# Patient Record
Sex: MALE | Race: WHITE | NOT HISPANIC OR LATINO
[De-identification: names, ages, dates, MRNs, and addresses within clinical notes are randomized per-mention and may not be internally consistent; named-entity substitution may affect disease eponyms.]

---

## 2019-01-16 ENCOUNTER — RESULT REVIEW (OUTPATIENT)
Age: 49
End: 2019-01-16

## 2019-06-18 ENCOUNTER — RESULT REVIEW (OUTPATIENT)
Age: 49
End: 2019-06-18

## 2020-12-12 ENCOUNTER — OUTPATIENT (OUTPATIENT)
Dept: OUTPATIENT SERVICES | Facility: HOSPITAL | Age: 50
LOS: 1 days | End: 2020-12-12
Payer: COMMERCIAL

## 2020-12-12 DIAGNOSIS — Z11.59 ENCOUNTER FOR SCREENING FOR OTHER VIRAL DISEASES: ICD-10-CM

## 2020-12-12 LAB — SARS-COV-2 RNA SPEC QL NAA+PROBE: SIGNIFICANT CHANGE UP

## 2020-12-12 PROCEDURE — U0003: CPT

## 2020-12-13 DIAGNOSIS — Z11.59 ENCOUNTER FOR SCREENING FOR OTHER VIRAL DISEASES: ICD-10-CM

## 2021-01-02 ENCOUNTER — OUTPATIENT (OUTPATIENT)
Dept: OUTPATIENT SERVICES | Facility: HOSPITAL | Age: 51
LOS: 1 days | End: 2021-01-02
Payer: COMMERCIAL

## 2021-01-02 DIAGNOSIS — Z20.828 CONTACT WITH AND (SUSPECTED) EXPOSURE TO OTHER VIRAL COMMUNICABLE DISEASES: ICD-10-CM

## 2021-01-02 PROCEDURE — C9803: CPT

## 2021-01-02 PROCEDURE — U0005: CPT

## 2021-01-02 PROCEDURE — U0003: CPT

## 2021-01-03 DIAGNOSIS — Z20.828 CONTACT WITH AND (SUSPECTED) EXPOSURE TO OTHER VIRAL COMMUNICABLE DISEASES: ICD-10-CM

## 2021-01-03 LAB — SARS-COV-2 RNA SPEC QL NAA+PROBE: SIGNIFICANT CHANGE UP

## 2021-11-17 ENCOUNTER — APPOINTMENT (OUTPATIENT)
Dept: FAMILY MEDICINE | Facility: CLINIC | Age: 51
End: 2021-11-17

## 2021-11-24 ENCOUNTER — APPOINTMENT (OUTPATIENT)
Dept: FAMILY MEDICINE | Facility: CLINIC | Age: 51
End: 2021-11-24
Payer: COMMERCIAL

## 2021-11-24 PROCEDURE — 90686 IIV4 VACC NO PRSV 0.5 ML IM: CPT

## 2021-11-24 PROCEDURE — G0008: CPT

## 2022-01-19 ENCOUNTER — RESULT CHARGE (OUTPATIENT)
Age: 52
End: 2022-01-19

## 2022-01-20 ENCOUNTER — APPOINTMENT (OUTPATIENT)
Dept: FAMILY MEDICINE | Facility: CLINIC | Age: 52
End: 2022-01-20
Payer: COMMERCIAL

## 2022-01-20 ENCOUNTER — NON-APPOINTMENT (OUTPATIENT)
Age: 52
End: 2022-01-20

## 2022-01-20 VITALS
HEART RATE: 71 BPM | BODY MASS INDEX: 30.16 KG/M2 | TEMPERATURE: 98.4 F | DIASTOLIC BLOOD PRESSURE: 78 MMHG | SYSTOLIC BLOOD PRESSURE: 120 MMHG | OXYGEN SATURATION: 96 % | WEIGHT: 199 LBS | HEIGHT: 68 IN

## 2022-01-20 DIAGNOSIS — Z86.19 PERSONAL HISTORY OF OTHER INFECTIOUS AND PARASITIC DISEASES: ICD-10-CM

## 2022-01-20 DIAGNOSIS — Z78.9 OTHER SPECIFIED HEALTH STATUS: ICD-10-CM

## 2022-01-20 DIAGNOSIS — Z83.1 FAMILY HISTORY OF OTHER INFECTIOUS AND PARASITIC DISEASES: ICD-10-CM

## 2022-01-20 DIAGNOSIS — R05.9 COUGH, UNSPECIFIED: ICD-10-CM

## 2022-01-20 DIAGNOSIS — R07.89 OTHER CHEST PAIN: ICD-10-CM

## 2022-01-20 DIAGNOSIS — M75.50 BURSITIS OF UNSPECIFIED SHOULDER: ICD-10-CM

## 2022-01-20 PROCEDURE — 93000 ELECTROCARDIOGRAM COMPLETE: CPT

## 2022-01-20 PROCEDURE — 99213 OFFICE O/P EST LOW 20 MIN: CPT | Mod: 25

## 2022-01-20 RX ORDER — FLUTICASONE PROPIONATE 50 UG/1
50 SPRAY, METERED NASAL DAILY
Qty: 1 | Refills: 1 | Status: ACTIVE | COMMUNITY
Start: 2022-01-20 | End: 1900-01-01

## 2022-01-20 NOTE — ASSESSMENT
[FreeTextEntry1] : Patient is a 52yo male presenting to the office complaining of chronic cough xmonths, notices mostly at night, becoming more wet over the past 2-3 days, without production of sputum.  Pt states he noticed a very faint heaviness in his chest which is non-exertional and very atypical.  Negative stress test with Cardiology within the past 6 months.\par \par Cough\par - Admits to feelings of PND, will try Flonase.\par - Pt advised to see if Flonase helps, if not try OTC antacid for possible GERD cause as symptoms occur mostly at night when laying flat.\par - Consider Pulmonology follow up as pt with history of tobacco smoking for many years.  Referral provided today (Dr. Howell).\par - Offered Albuterol, pt declines.\par \par Chest Heaviness\par - EKG performed today NSR without acute ST/T wave changes.\par - Recent stress test within the past 6 months reassuring.\par - Symptoms very atypical for anginal chest discomfort.\par - Alert office or go to the ED with any new, worsening or concerning symptoms including severe pains, shortness of breath, difficulty breathing, dizziness, fainting, or any other concerning symptoms.\par \par Call the office or go to the ED immediately if you develop new, worsening or concerning symptoms including high fever, severe headache/worst headache of your life, confusion, dizziness/lightheadedness, loss of consciousness, severe chest pain, difficulty breathing, shortness of breath, severe abdominal pain, excessive vomiting/diarrhea, inability to feel/move the extremities, or any other concerning symptoms.\par

## 2022-01-20 NOTE — HISTORY OF PRESENT ILLNESS
[FreeTextEntry8] : Pt is a 50yo male presenting to the office complaining of cough.  Pt has had chronic cough xmonths, worse at night.  States over the past 2-3 days has noticed cough to be slightly more wet.  Does not produce sputum.  States he has mild heaviness in the central chest but denies true pain, radiation, or SOB.  Pt states "discomfort is too strong of a word" and this is very mild, believed to be secondary to his cough.  Denies leg pain/swelling, recent travel/surgeries, history of blood clots, or hormone use.  Has not tried anything for symptoms.  Denies URI symptoms, but does admit he has intermittent PND.  Denies abdominal pain, N/V/D.  No known sick contacts\par Pfizer x3, booster 11/2021\par \par pt has seen Cardiology in the past, Dr. Mejia.  Last OV approx. 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 3 years ago

## 2022-01-20 NOTE — PHYSICAL EXAM
[No Edema] : there was no peripheral edema [Normal] : no rash [Coordination Grossly Intact] : coordination grossly intact [No Focal Deficits] : no focal deficits [Normal Gait] : normal gait [Normal Affect] : the affect was normal [Normal Insight/Judgement] : insight and judgment were intact

## 2022-01-20 NOTE — HEALTH RISK ASSESSMENT
[Former] : Former [Yes] : Yes [2 - 4 times a month (2 pts)] : 2-4 times a month (2 points) [1 or 2 (0 pts)] : 1 or 2 (0 points) [Never (0 pts)] : Never (0 points) [No] : In the past 12 months have you used drugs other than those required for medical reasons? No [No falls in past year] : Patient reported no falls in the past year [0] : 2) Feeling down, depressed, or hopeless: Not at all (0) [PHQ-2 Negative - No further assessment needed] : PHQ-2 Negative - No further assessment needed [Audit-CScore] : 2 [de-identified] : limited activity [de-identified] : well balanced [[id number]] : 0

## 2022-01-20 NOTE — REVIEW OF SYSTEMS
[Cough] : cough [Negative] : Neurological [Earache] : no earache [Postnasal Drip] : postnasal drip [Nasal Discharge] : no nasal discharge [Sore Throat] : no sore throat [Shortness Of Breath] : no shortness of breath [Wheezing] : no wheezing [Dyspnea on Exertion] : not dyspnea on exertion

## 2022-01-20 NOTE — PLAN
[FreeTextEntry1] : See assessment.\par Flonase, if failed try antacid OTC.\par Follow up with Pulmonology.\par Follow up with PCP PRN.

## 2022-05-02 ENCOUNTER — APPOINTMENT (OUTPATIENT)
Dept: FAMILY MEDICINE | Facility: CLINIC | Age: 52
End: 2022-05-02

## 2022-05-12 ENCOUNTER — FORM ENCOUNTER (OUTPATIENT)
Age: 52
End: 2022-05-12

## 2022-05-18 ENCOUNTER — APPOINTMENT (OUTPATIENT)
Dept: FAMILY MEDICINE | Facility: CLINIC | Age: 52
End: 2022-05-18
Payer: COMMERCIAL

## 2022-05-18 VITALS
TEMPERATURE: 97.5 F | OXYGEN SATURATION: 98 % | DIASTOLIC BLOOD PRESSURE: 80 MMHG | SYSTOLIC BLOOD PRESSURE: 120 MMHG | HEART RATE: 87 BPM | HEIGHT: 68 IN | BODY MASS INDEX: 29.55 KG/M2 | WEIGHT: 195 LBS

## 2022-05-18 DIAGNOSIS — F51.02 ADJUSTMENT INSOMNIA: ICD-10-CM

## 2022-05-18 DIAGNOSIS — G47.00 INSOMNIA, UNSPECIFIED: ICD-10-CM

## 2022-05-18 PROCEDURE — 99396 PREV VISIT EST AGE 40-64: CPT

## 2022-05-18 RX ORDER — ZOLPIDEM TARTRATE 5 MG/1
5 TABLET ORAL
Qty: 30 | Refills: 0 | Status: ACTIVE | COMMUNITY
Start: 2022-05-18 | End: 1900-01-01

## 2022-05-18 NOTE — PLAN
[FreeTextEntry1] : Check labs as above. \par \par Reviewed age-appropriate preventive screening tests with patient. He will be due for Shingrix later this year. His last colonoscopy was in 2019 and he should discuss timing of his next one with Dr. Flores.\par \par Discussed clean eating (e.g. Mediterranean style diet) and recommendations for regular exercise/staying as physically active as possible.\par \par Reviewed importance of good self care (e.g. meditation, yoga, adequate rest, regular exercise, magnesium, clean eating, etc.).\par \par Follow up for next physical in one year.\par

## 2022-05-18 NOTE — ASSESSMENT
[FreeTextEntry1] : RAPHAEL PALM is a 51 year old male here for a physical exam.\par \par He had a visit earlier this year for chest discomfort. He had an EKG which was abnormal but similar to previous. He had had a negative stress test within the past year.\par \par He is also seeing a rheumatologist at [facility] for joint pain with no clear diagnosis at this point. His pain improved on oral steroids and has worsened since he stopped the steroids.\par \par He has been having insomnia and has tried Ambien which works well for him. He would like a prescription for Ambien for himself.

## 2022-05-18 NOTE — HISTORY OF PRESENT ILLNESS
[FreeTextEntry1] : RAPHAEL PALM is a 51 year old male here for a physical exam.  [de-identified] : His last physical exam was last year\par \par Vaccines:\par Tetanus is up to date\par COVID vaccine is up to date\par \par His last dentist visit was less than one year ago\par His last eye doctor appointment was less than one year ago\par His last dermatologist visit was less than one year ago\par \par Colon cancer screening: colonoscopy 5/2022 (Dr. Flores)\par \par His diet is healthy overall\par Exercise: walking

## 2022-08-26 ENCOUNTER — NON-APPOINTMENT (OUTPATIENT)
Age: 52
End: 2022-08-26

## 2022-08-29 ENCOUNTER — APPOINTMENT (OUTPATIENT)
Dept: UROLOGY | Facility: CLINIC | Age: 52
End: 2022-08-29

## 2022-08-29 VITALS
WEIGHT: 190 LBS | HEART RATE: 73 BPM | DIASTOLIC BLOOD PRESSURE: 77 MMHG | SYSTOLIC BLOOD PRESSURE: 131 MMHG | BODY MASS INDEX: 28.79 KG/M2 | HEIGHT: 68 IN | OXYGEN SATURATION: 97 %

## 2022-08-29 DIAGNOSIS — R31.9 HEMATURIA, UNSPECIFIED: ICD-10-CM

## 2022-08-29 LAB
APPEARANCE: ABNORMAL
BACTERIA UR CULT: NORMAL
BACTERIA: NEGATIVE
BILIRUBIN URINE: NEGATIVE
BLOOD URINE: ABNORMAL
COLOR: YELLOW
GLUCOSE QUALITATIVE U: NEGATIVE
HYALINE CASTS: 0 /LPF
KETONES URINE: NEGATIVE
LEUKOCYTE ESTERASE URINE: NEGATIVE
MICROSCOPIC-UA: NORMAL
NITRITE URINE: NEGATIVE
PH URINE: 7.5
PROTEIN URINE: ABNORMAL
RED BLOOD CELLS URINE: >720 /HPF
SPECIFIC GRAVITY URINE: 1.03
SQUAMOUS EPITHELIAL CELLS: 0 /HPF
UROBILINOGEN URINE: ABNORMAL
WHITE BLOOD CELLS URINE: 1 /HPF

## 2022-08-29 PROCEDURE — 99204 OFFICE O/P NEW MOD 45 MIN: CPT

## 2022-08-29 NOTE — ASSESSMENT
[FreeTextEntry1] : Labs, CAT scan and Xray are ordered. Patient will return to the office for a cystoscopy.

## 2022-08-29 NOTE — HISTORY OF PRESENT ILLNESS
[FreeTextEntry1] : This patient presents with a 5 day history of painless gross hematuria. Reports it has now resolved. He denies any prior episodes. He notes he has history of kidney stones. He denies any current urinary symptoms.

## 2022-08-29 NOTE — REVIEW OF SYSTEMS
[Negative] : Heme/Lymph [Blood in urine that you can see] : blood visible in urine [History of kidney stones] : history of kidney stones [Joint Pain] : joint pain [Joint Swelling] : joint swelling

## 2022-08-31 LAB
ANION GAP SERPL CALC-SCNC: 12 MMOL/L
BUN SERPL-MCNC: 16 MG/DL
CALCIUM SERPL-MCNC: 9.7 MG/DL
CHLORIDE SERPL-SCNC: 106 MMOL/L
CO2 SERPL-SCNC: 24 MMOL/L
CREAT SERPL-MCNC: 1.18 MG/DL
EGFR: 75 ML/MIN/1.73M2
GLUCOSE SERPL-MCNC: 95 MG/DL
POTASSIUM SERPL-SCNC: 4.1 MMOL/L
PSA SERPL-MCNC: 1.62 NG/ML
SODIUM SERPL-SCNC: 142 MMOL/L
URINE CYTOLOGY: NORMAL

## 2022-09-07 ENCOUNTER — APPOINTMENT (OUTPATIENT)
Dept: RADIOLOGY | Facility: CLINIC | Age: 52
End: 2022-09-07

## 2022-09-07 ENCOUNTER — OUTPATIENT (OUTPATIENT)
Dept: OUTPATIENT SERVICES | Facility: HOSPITAL | Age: 52
LOS: 1 days | End: 2022-09-07
Payer: COMMERCIAL

## 2022-09-07 ENCOUNTER — APPOINTMENT (OUTPATIENT)
Dept: CT IMAGING | Facility: CLINIC | Age: 52
End: 2022-09-07

## 2022-09-07 DIAGNOSIS — R31.9 HEMATURIA, UNSPECIFIED: ICD-10-CM

## 2022-09-07 PROCEDURE — 74178 CT ABD&PLV WO CNTR FLWD CNTR: CPT | Mod: 26

## 2022-09-07 PROCEDURE — 74018 RADEX ABDOMEN 1 VIEW: CPT | Mod: 26

## 2022-09-07 PROCEDURE — 74018 RADEX ABDOMEN 1 VIEW: CPT

## 2022-09-07 PROCEDURE — 74178 CT ABD&PLV WO CNTR FLWD CNTR: CPT

## 2022-09-12 ENCOUNTER — NON-APPOINTMENT (OUTPATIENT)
Age: 52
End: 2022-09-12

## 2022-09-13 ENCOUNTER — INPATIENT (INPATIENT)
Facility: HOSPITAL | Age: 52
LOS: 2 days | Discharge: HOME | DRG: 694 | End: 2022-09-16
Attending: FAMILY MEDICINE | Admitting: HOSPITALIST
Payer: COMMERCIAL

## 2022-09-13 VITALS — WEIGHT: 192.02 LBS | HEIGHT: 68 IN

## 2022-09-13 DIAGNOSIS — N20.9 URINARY CALCULUS, UNSPECIFIED: ICD-10-CM

## 2022-09-13 LAB
ALBUMIN SERPL ELPH-MCNC: 4.3 G/DL — SIGNIFICANT CHANGE UP (ref 3.3–5)
ALP SERPL-CCNC: 77 U/L — SIGNIFICANT CHANGE UP (ref 40–120)
ALT FLD-CCNC: 19 U/L — SIGNIFICANT CHANGE UP (ref 12–78)
ANION GAP SERPL CALC-SCNC: 11 MMOL/L — SIGNIFICANT CHANGE UP (ref 5–17)
APTT BLD: 27.4 SEC — LOW (ref 27.5–35.5)
AST SERPL-CCNC: 10 U/L — LOW (ref 15–37)
BASOPHILS # BLD AUTO: 0.08 K/UL — SIGNIFICANT CHANGE UP (ref 0–0.2)
BASOPHILS NFR BLD AUTO: 0.6 % — SIGNIFICANT CHANGE UP (ref 0–2)
BILIRUB SERPL-MCNC: 0.8 MG/DL — SIGNIFICANT CHANGE UP (ref 0.2–1.2)
BUN SERPL-MCNC: 16 MG/DL — SIGNIFICANT CHANGE UP (ref 7–23)
CALCIUM SERPL-MCNC: 9.4 MG/DL — SIGNIFICANT CHANGE UP (ref 8.5–10.1)
CHLORIDE SERPL-SCNC: 105 MMOL/L — SIGNIFICANT CHANGE UP (ref 96–108)
CO2 SERPL-SCNC: 21 MMOL/L — LOW (ref 22–31)
CREAT SERPL-MCNC: 1.3 MG/DL — SIGNIFICANT CHANGE UP (ref 0.5–1.3)
EGFR: 67 ML/MIN/1.73M2 — SIGNIFICANT CHANGE UP
EOSINOPHIL # BLD AUTO: 0.05 K/UL — SIGNIFICANT CHANGE UP (ref 0–0.5)
EOSINOPHIL NFR BLD AUTO: 0.4 % — SIGNIFICANT CHANGE UP (ref 0–6)
FLUAV AG NPH QL: SIGNIFICANT CHANGE UP
FLUBV AG NPH QL: SIGNIFICANT CHANGE UP
GLUCOSE SERPL-MCNC: 146 MG/DL — HIGH (ref 70–99)
HCT VFR BLD CALC: 42.3 % — SIGNIFICANT CHANGE UP (ref 39–50)
HGB BLD-MCNC: 14.3 G/DL — SIGNIFICANT CHANGE UP (ref 13–17)
IMM GRANULOCYTES NFR BLD AUTO: 0.4 % — SIGNIFICANT CHANGE UP (ref 0–1.5)
INR BLD: 1.09 RATIO — SIGNIFICANT CHANGE UP (ref 0.88–1.16)
LACTATE SERPL-SCNC: 4.8 MMOL/L — CRITICAL HIGH (ref 0.7–2)
LIDOCAIN IGE QN: 78 U/L — SIGNIFICANT CHANGE UP (ref 73–393)
LYMPHOCYTES # BLD AUTO: 1.4 K/UL — SIGNIFICANT CHANGE UP (ref 1–3.3)
LYMPHOCYTES # BLD AUTO: 9.8 % — LOW (ref 13–44)
MCHC RBC-ENTMCNC: 28.5 PG — SIGNIFICANT CHANGE UP (ref 27–34)
MCHC RBC-ENTMCNC: 33.8 GM/DL — SIGNIFICANT CHANGE UP (ref 32–36)
MCV RBC AUTO: 84.4 FL — SIGNIFICANT CHANGE UP (ref 80–100)
MONOCYTES # BLD AUTO: 0.51 K/UL — SIGNIFICANT CHANGE UP (ref 0–0.9)
MONOCYTES NFR BLD AUTO: 3.6 % — SIGNIFICANT CHANGE UP (ref 2–14)
NEUTROPHILS # BLD AUTO: 12.15 K/UL — HIGH (ref 1.8–7.4)
NEUTROPHILS NFR BLD AUTO: 85.2 % — HIGH (ref 43–77)
PLATELET # BLD AUTO: 366 K/UL — SIGNIFICANT CHANGE UP (ref 150–400)
POTASSIUM SERPL-MCNC: 3.6 MMOL/L — SIGNIFICANT CHANGE UP (ref 3.5–5.3)
POTASSIUM SERPL-SCNC: 3.6 MMOL/L — SIGNIFICANT CHANGE UP (ref 3.5–5.3)
PROT SERPL-MCNC: 7.6 GM/DL — SIGNIFICANT CHANGE UP (ref 6–8.3)
PROTHROM AB SERPL-ACNC: 12.7 SEC — SIGNIFICANT CHANGE UP (ref 10.5–13.4)
RBC # BLD: 5.01 M/UL — SIGNIFICANT CHANGE UP (ref 4.2–5.8)
RBC # FLD: 13.2 % — SIGNIFICANT CHANGE UP (ref 10.3–14.5)
RSV RNA NPH QL NAA+NON-PROBE: SIGNIFICANT CHANGE UP
SARS-COV-2 RNA SPEC QL NAA+PROBE: SIGNIFICANT CHANGE UP
SODIUM SERPL-SCNC: 137 MMOL/L — SIGNIFICANT CHANGE UP (ref 135–145)
WBC # BLD: 14.24 K/UL — HIGH (ref 3.8–10.5)
WBC # FLD AUTO: 14.24 K/UL — HIGH (ref 3.8–10.5)

## 2022-09-13 PROCEDURE — 83735 ASSAY OF MAGNESIUM: CPT

## 2022-09-13 PROCEDURE — 84100 ASSAY OF PHOSPHORUS: CPT

## 2022-09-13 PROCEDURE — 76000 FLUOROSCOPY <1 HR PHYS/QHP: CPT

## 2022-09-13 PROCEDURE — 86850 RBC ANTIBODY SCREEN: CPT

## 2022-09-13 PROCEDURE — 86900 BLOOD TYPING SEROLOGIC ABO: CPT

## 2022-09-13 PROCEDURE — 36415 COLL VENOUS BLD VENIPUNCTURE: CPT

## 2022-09-13 PROCEDURE — 85027 COMPLETE CBC AUTOMATED: CPT

## 2022-09-13 PROCEDURE — 74176 CT ABD & PELVIS W/O CONTRAST: CPT | Mod: 26,MA

## 2022-09-13 PROCEDURE — C2617: CPT

## 2022-09-13 PROCEDURE — 93005 ELECTROCARDIOGRAM TRACING: CPT

## 2022-09-13 PROCEDURE — 85610 PROTHROMBIN TIME: CPT

## 2022-09-13 PROCEDURE — 88300 SURGICAL PATH GROSS: CPT

## 2022-09-13 PROCEDURE — 85025 COMPLETE CBC W/AUTO DIFF WBC: CPT

## 2022-09-13 PROCEDURE — 82365 CALCULUS SPECTROSCOPY: CPT

## 2022-09-13 PROCEDURE — 99285 EMERGENCY DEPT VISIT HI MDM: CPT

## 2022-09-13 PROCEDURE — 87086 URINE CULTURE/COLONY COUNT: CPT

## 2022-09-13 PROCEDURE — 81001 URINALYSIS AUTO W/SCOPE: CPT

## 2022-09-13 PROCEDURE — 99222 1ST HOSP IP/OBS MODERATE 55: CPT

## 2022-09-13 PROCEDURE — 83605 ASSAY OF LACTIC ACID: CPT

## 2022-09-13 PROCEDURE — 87040 BLOOD CULTURE FOR BACTERIA: CPT

## 2022-09-13 PROCEDURE — 86901 BLOOD TYPING SEROLOGIC RH(D): CPT

## 2022-09-13 PROCEDURE — 80048 BASIC METABOLIC PNL TOTAL CA: CPT

## 2022-09-13 RX ORDER — CEFTRIAXONE 500 MG/1
1000 INJECTION, POWDER, FOR SOLUTION INTRAMUSCULAR; INTRAVENOUS ONCE
Refills: 0 | Status: COMPLETED | OUTPATIENT
Start: 2022-09-13 | End: 2022-09-13

## 2022-09-13 RX ORDER — HYDROMORPHONE HYDROCHLORIDE 2 MG/ML
0.5 INJECTION INTRAMUSCULAR; INTRAVENOUS; SUBCUTANEOUS EVERY 4 HOURS
Refills: 0 | Status: DISCONTINUED | OUTPATIENT
Start: 2022-09-13 | End: 2022-09-16

## 2022-09-13 RX ORDER — MORPHINE SULFATE 50 MG/1
4 CAPSULE, EXTENDED RELEASE ORAL ONCE
Refills: 0 | Status: DISCONTINUED | OUTPATIENT
Start: 2022-09-13 | End: 2022-09-13

## 2022-09-13 RX ORDER — ONDANSETRON 8 MG/1
4 TABLET, FILM COATED ORAL EVERY 8 HOURS
Refills: 0 | Status: DISCONTINUED | OUTPATIENT
Start: 2022-09-13 | End: 2022-09-15

## 2022-09-13 RX ORDER — SODIUM CHLORIDE 9 MG/ML
1000 INJECTION INTRAMUSCULAR; INTRAVENOUS; SUBCUTANEOUS ONCE
Refills: 0 | Status: COMPLETED | OUTPATIENT
Start: 2022-09-13 | End: 2022-09-13

## 2022-09-13 RX ORDER — HYDROMORPHONE HYDROCHLORIDE 2 MG/ML
1 INJECTION INTRAMUSCULAR; INTRAVENOUS; SUBCUTANEOUS ONCE
Refills: 0 | Status: DISCONTINUED | OUTPATIENT
Start: 2022-09-13 | End: 2022-09-14

## 2022-09-13 RX ORDER — SODIUM CHLORIDE 9 MG/ML
1700 INJECTION INTRAMUSCULAR; INTRAVENOUS; SUBCUTANEOUS ONCE
Refills: 0 | Status: COMPLETED | OUTPATIENT
Start: 2022-09-13 | End: 2022-09-13

## 2022-09-13 RX ORDER — POLYETHYLENE GLYCOL 3350 17 G/17G
17 POWDER, FOR SOLUTION ORAL DAILY
Refills: 0 | Status: DISCONTINUED | OUTPATIENT
Start: 2022-09-13 | End: 2022-09-16

## 2022-09-13 RX ORDER — ACETAMINOPHEN 500 MG
650 TABLET ORAL EVERY 6 HOURS
Refills: 0 | Status: DISCONTINUED | OUTPATIENT
Start: 2022-09-13 | End: 2022-09-16

## 2022-09-13 RX ORDER — ONDANSETRON 8 MG/1
4 TABLET, FILM COATED ORAL ONCE
Refills: 0 | Status: COMPLETED | OUTPATIENT
Start: 2022-09-13 | End: 2022-09-13

## 2022-09-13 RX ORDER — ONDANSETRON 8 MG/1
4 TABLET, FILM COATED ORAL EVERY 6 HOURS
Refills: 0 | Status: DISCONTINUED | OUTPATIENT
Start: 2022-09-13 | End: 2022-09-15

## 2022-09-13 RX ORDER — KETOROLAC TROMETHAMINE 30 MG/ML
15 SYRINGE (ML) INJECTION ONCE
Refills: 0 | Status: DISCONTINUED | OUTPATIENT
Start: 2022-09-13 | End: 2022-09-13

## 2022-09-13 RX ORDER — SODIUM CHLORIDE 9 MG/ML
1000 INJECTION INTRAMUSCULAR; INTRAVENOUS; SUBCUTANEOUS
Refills: 0 | Status: COMPLETED | OUTPATIENT
Start: 2022-09-13 | End: 2022-09-14

## 2022-09-13 RX ORDER — LANOLIN ALCOHOL/MO/W.PET/CERES
3 CREAM (GRAM) TOPICAL AT BEDTIME
Refills: 0 | Status: DISCONTINUED | OUTPATIENT
Start: 2022-09-13 | End: 2022-09-16

## 2022-09-13 RX ORDER — HYDROMORPHONE HYDROCHLORIDE 2 MG/ML
1 INJECTION INTRAMUSCULAR; INTRAVENOUS; SUBCUTANEOUS ONCE
Refills: 0 | Status: DISCONTINUED | OUTPATIENT
Start: 2022-09-13 | End: 2022-09-13

## 2022-09-13 RX ORDER — CEFTRIAXONE 500 MG/1
1000 INJECTION, POWDER, FOR SOLUTION INTRAMUSCULAR; INTRAVENOUS EVERY 24 HOURS
Refills: 0 | Status: DISCONTINUED | OUTPATIENT
Start: 2022-09-13 | End: 2022-09-16

## 2022-09-13 RX ORDER — HYDROMORPHONE HYDROCHLORIDE 2 MG/ML
0.25 INJECTION INTRAMUSCULAR; INTRAVENOUS; SUBCUTANEOUS EVERY 4 HOURS
Refills: 0 | Status: DISCONTINUED | OUTPATIENT
Start: 2022-09-13 | End: 2022-09-16

## 2022-09-13 RX ORDER — NALOXONE HYDROCHLORIDE 4 MG/.1ML
0.4 SPRAY NASAL ONCE
Refills: 0 | Status: DISCONTINUED | OUTPATIENT
Start: 2022-09-13 | End: 2022-09-16

## 2022-09-13 RX ADMIN — MORPHINE SULFATE 4 MILLIGRAM(S): 50 CAPSULE, EXTENDED RELEASE ORAL at 18:30

## 2022-09-13 RX ADMIN — ONDANSETRON 4 MILLIGRAM(S): 8 TABLET, FILM COATED ORAL at 18:30

## 2022-09-13 RX ADMIN — Medication 15 MILLIGRAM(S): at 20:36

## 2022-09-13 RX ADMIN — Medication 15 MILLIGRAM(S): at 18:30

## 2022-09-13 RX ADMIN — CEFTRIAXONE 100 MILLIGRAM(S): 500 INJECTION, POWDER, FOR SOLUTION INTRAMUSCULAR; INTRAVENOUS at 22:47

## 2022-09-13 RX ADMIN — HYDROMORPHONE HYDROCHLORIDE 1 MILLIGRAM(S): 2 INJECTION INTRAMUSCULAR; INTRAVENOUS; SUBCUTANEOUS at 20:37

## 2022-09-13 RX ADMIN — SODIUM CHLORIDE 1700 MILLILITER(S): 9 INJECTION INTRAMUSCULAR; INTRAVENOUS; SUBCUTANEOUS at 20:37

## 2022-09-13 RX ADMIN — SODIUM CHLORIDE 1000 MILLILITER(S): 9 INJECTION INTRAMUSCULAR; INTRAVENOUS; SUBCUTANEOUS at 18:32

## 2022-09-13 NOTE — ED STATDOCS - NS ED ROS FT
Constitutional: No fevers, chills, or sweats.  Cardiac: No chest pain, exertional dyspnea, orthopnea  Respiratory: No shortness of breath, no cough  GI: +RLQ pain  Neuro: No headaches, no neck pain/stiffness, no numbness  All other systems reviewed and are negative unless otherwise stated in the HPI.

## 2022-09-13 NOTE — ED STATDOCS - ATTENDING APP SHARED VISIT CONTRIBUTION OF CARE
I, Stanley Mejía MD, personally saw the patient with BENI.  I have personally performed a face to face diagnostic evaluation on this patient.  I have reviewed the BENI note and agree with the history, exam, and plan of care, except as noted.

## 2022-09-13 NOTE — ED ADULT TRIAGE NOTE - CHIEF COMPLAINT QUOTE
patient presenting ambulatory to ED c/o severe lower abdominal pain. patient denies n/v/d, fever, urinary symptoms. patient had recent CT scan that shows known kidney stones. patient pale, pacing in triage.

## 2022-09-13 NOTE — ED STATDOCS - NS_ ATTENDINGSCRIBEDETAILS _ED_A_ED_FT
I, Stanley Mejía MD,  performed the initial face to face bedside interview with this patient regarding history of present illness, review of symptoms and relevant past medical, social and family history.  I completed an independent physical examination.  I was the initial provider who evaluated this patient. I have signed out the follow up of any pending tests (i.e. labs, radiological studies) to the BENI.  I have communicated the patient’s plan of care and disposition with the BENI.  The history, relevant review of systems, past medical and surgical history, medical decision making, and physical examination was documented by the scribe in my presence and I attest to the accuracy of the documentation.

## 2022-09-13 NOTE — ED STATDOCS - PHYSICAL EXAMINATION
General: NAD. In distress from pain, pacing.  HEENT: NCAT  Cardiac: Normal rate and rhythym, no murmurs, normal peripheral perfusion  Respiratory: Normal rate and effort. CTAB  GI: RLQ and right CVA tenderness.   Neuro: No focal deficits. KING equally x4, sensation to light touch intact throughout  MSK: FROMx4, no focal bony tenderness, no peripheral edema  Skin: No rash

## 2022-09-13 NOTE — ED STATDOCS - OBJECTIVE STATEMENT
51 y/o male w/ a PMHx presents to the ED c/o severe RLQ abd pain. Pt reports recently having outpt CT done last week which showed kidney stones. Pt states he had the CT after having hematuria x1 week. Denies n/v/d. Pt reports presenting today because it suddenly got worse a few hours ago. Pt states pain is worse than his last kidney stone. Pt endorses taking percocet PTA. Uro: Dr. Cook 53 y/o male w/ a PMHx presents to the ED c/o severe RLQ abd pain. Pt reports recently having outpt CT done last week which showed kidney stones. Pt states he had the CT after having hematuria x1 week. Denies n/v/d. Pt reports presenting today because it suddenly got worse a few hours ago. Pt states pain is worse than his last kidney stone. Pt endorses taking percocet PTA. Uro: Dr. Bodi

## 2022-09-13 NOTE — ED STATDOCS - NS ED ATTENDING STATEMENT MOD
This was a shared visit with the BENI. I reviewed and verified the documentation and independently performed the documented:

## 2022-09-13 NOTE — ED ADULT NURSE NOTE - OBJECTIVE STATEMENT
53 y/o male presents to the ED c/o severe RLQ abd pain. Pt reports recently having outpt CT done last week which showed kidney stones. Pt states he had the CT after having hematuria x1 week. Denies n/v/d. Pt reports presenting today because it suddenly got worse a few hours ago. Pt states pain is worse than his last kidney stone. Pt endorses taking percocet PTA. Uro: Dr. Bodi

## 2022-09-13 NOTE — ED STATDOCS - PROGRESS NOTE DETAILS
53 y/o M with PMH of kidney stones presents with RLQ pain. Had CT last week which showed a "large" kidney stone. States he was scheduled for surgery with Dr. Roblero in 2 weeks, but today pain significantly worsened. States he called Dr Roblero's office, was advised to control pain at home with medication. If unable to control to go to ED. Denies fever. +nausea w/out vomiting, +hematuria. PE: Uncomfortable appearing. Cardiac: s1s2, RRR. lungs: CTAB. Abdomen: NBS x4, RLQ and right CVA tenderness. A/P: Kidney stone, will obtain labs, CT, analgesia, d/w urology. - Luigi Petit PA-C Lactate 4.8. Additional 1.7L NS for total of 2700cc for 30cc/kg. - Luigi Petit PA-C CT findings appreciated, 11mm stone with new hydro. Dr. Annika angel for consult. - Luigi Petit PA-C D/w Dr. Roblero,  pending, labs and CT findings reviewed. Advised continued plan for outpatient management. Advised pain control and dc home. Concern expressed for patient significant pain level as he reports no change with morphine and toradol. Dilaudid not yet provided. Made aware potential admission for pain control. - Luigi Petit PA-C Pt admitted to  with intractable pain 2/2 kidney stone. Dr. Barbosa as accepting. - Luigi Petit PA-C

## 2022-09-13 NOTE — ED STATDOCS - NS_EDPROVIDERDISPOUSERTYPE_ED_A_ED
Scribe Attestation (For Scribes USE Only)... Name band; Attending Attestation (For Attendings USE Only).../Scribe Attestation (For Scribes USE Only)...

## 2022-09-13 NOTE — ED STATDOCS - CARE PLAN
none 1 Principal Discharge DX:	Urolithiasis  Secondary Diagnosis:	Intractable abdominal pain  Secondary Diagnosis:	Lactic acidosis

## 2022-09-14 DIAGNOSIS — Z98.52 VASECTOMY STATUS: Chronic | ICD-10-CM

## 2022-09-14 DIAGNOSIS — N23 UNSPECIFIED RENAL COLIC: ICD-10-CM

## 2022-09-14 DIAGNOSIS — N20.0 CALCULUS OF KIDNEY: ICD-10-CM

## 2022-09-14 LAB
ANION GAP SERPL CALC-SCNC: 6 MMOL/L — SIGNIFICANT CHANGE UP (ref 5–17)
APPEARANCE UR: CLEAR — SIGNIFICANT CHANGE UP
BASOPHILS # BLD AUTO: 0.05 K/UL — SIGNIFICANT CHANGE UP (ref 0–0.2)
BASOPHILS NFR BLD AUTO: 0.5 % — SIGNIFICANT CHANGE UP (ref 0–2)
BILIRUB UR-MCNC: NEGATIVE — SIGNIFICANT CHANGE UP
BUN SERPL-MCNC: 13 MG/DL — SIGNIFICANT CHANGE UP (ref 7–23)
CALCIUM SERPL-MCNC: 8.1 MG/DL — LOW (ref 8.5–10.1)
CHLORIDE SERPL-SCNC: 110 MMOL/L — HIGH (ref 96–108)
CO2 SERPL-SCNC: 24 MMOL/L — SIGNIFICANT CHANGE UP (ref 22–31)
COLOR SPEC: YELLOW — SIGNIFICANT CHANGE UP
CREAT SERPL-MCNC: 0.81 MG/DL — SIGNIFICANT CHANGE UP (ref 0.5–1.3)
DIFF PNL FLD: ABNORMAL
EGFR: 107 ML/MIN/1.73M2 — SIGNIFICANT CHANGE UP
EOSINOPHIL # BLD AUTO: 0.16 K/UL — SIGNIFICANT CHANGE UP (ref 0–0.5)
EOSINOPHIL NFR BLD AUTO: 1.7 % — SIGNIFICANT CHANGE UP (ref 0–6)
GLUCOSE SERPL-MCNC: 98 MG/DL — SIGNIFICANT CHANGE UP (ref 70–99)
GLUCOSE UR QL: NEGATIVE — SIGNIFICANT CHANGE UP
HCT VFR BLD CALC: 36.1 % — LOW (ref 39–50)
HGB BLD-MCNC: 12 G/DL — LOW (ref 13–17)
IMM GRANULOCYTES NFR BLD AUTO: 0.3 % — SIGNIFICANT CHANGE UP (ref 0–1.5)
INR BLD: 1.18 RATIO — HIGH (ref 0.88–1.16)
KETONES UR-MCNC: ABNORMAL
LEUKOCYTE ESTERASE UR-ACNC: ABNORMAL
LYMPHOCYTES # BLD AUTO: 2.42 K/UL — SIGNIFICANT CHANGE UP (ref 1–3.3)
LYMPHOCYTES # BLD AUTO: 25.5 % — SIGNIFICANT CHANGE UP (ref 13–44)
MCHC RBC-ENTMCNC: 28.7 PG — SIGNIFICANT CHANGE UP (ref 27–34)
MCHC RBC-ENTMCNC: 33.2 GM/DL — SIGNIFICANT CHANGE UP (ref 32–36)
MCV RBC AUTO: 86.4 FL — SIGNIFICANT CHANGE UP (ref 80–100)
MONOCYTES # BLD AUTO: 0.58 K/UL — SIGNIFICANT CHANGE UP (ref 0–0.9)
MONOCYTES NFR BLD AUTO: 6.1 % — SIGNIFICANT CHANGE UP (ref 2–14)
NEUTROPHILS # BLD AUTO: 6.24 K/UL — SIGNIFICANT CHANGE UP (ref 1.8–7.4)
NEUTROPHILS NFR BLD AUTO: 65.9 % — SIGNIFICANT CHANGE UP (ref 43–77)
NITRITE UR-MCNC: NEGATIVE — SIGNIFICANT CHANGE UP
PH UR: 5 — SIGNIFICANT CHANGE UP (ref 5–8)
PLATELET # BLD AUTO: 273 K/UL — SIGNIFICANT CHANGE UP (ref 150–400)
POTASSIUM SERPL-MCNC: 3.7 MMOL/L — SIGNIFICANT CHANGE UP (ref 3.5–5.3)
POTASSIUM SERPL-SCNC: 3.7 MMOL/L — SIGNIFICANT CHANGE UP (ref 3.5–5.3)
PROT UR-MCNC: 30 MG/DL
PROTHROM AB SERPL-ACNC: 13.7 SEC — HIGH (ref 10.5–13.4)
RBC # BLD: 4.18 M/UL — LOW (ref 4.2–5.8)
RBC # FLD: 13.3 % — SIGNIFICANT CHANGE UP (ref 10.3–14.5)
SODIUM SERPL-SCNC: 140 MMOL/L — SIGNIFICANT CHANGE UP (ref 135–145)
SP GR SPEC: 1.02 — SIGNIFICANT CHANGE UP (ref 1.01–1.02)
UROBILINOGEN FLD QL: 1
WBC # BLD: 9.48 K/UL — SIGNIFICANT CHANGE UP (ref 3.8–10.5)
WBC # FLD AUTO: 9.48 K/UL — SIGNIFICANT CHANGE UP (ref 3.8–10.5)

## 2022-09-14 PROCEDURE — 99233 SBSQ HOSP IP/OBS HIGH 50: CPT | Mod: GC

## 2022-09-14 PROCEDURE — 93010 ELECTROCARDIOGRAM REPORT: CPT

## 2022-09-14 PROCEDURE — 99222 1ST HOSP IP/OBS MODERATE 55: CPT

## 2022-09-14 RX ORDER — TAMSULOSIN HYDROCHLORIDE 0.4 MG/1
0.4 CAPSULE ORAL DAILY
Refills: 0 | Status: DISCONTINUED | OUTPATIENT
Start: 2022-09-14 | End: 2022-09-16

## 2022-09-14 RX ADMIN — SODIUM CHLORIDE 125 MILLILITER(S): 9 INJECTION INTRAMUSCULAR; INTRAVENOUS; SUBCUTANEOUS at 01:10

## 2022-09-14 RX ADMIN — HYDROMORPHONE HYDROCHLORIDE 0.25 MILLIGRAM(S): 2 INJECTION INTRAMUSCULAR; INTRAVENOUS; SUBCUTANEOUS at 21:47

## 2022-09-14 RX ADMIN — CEFTRIAXONE 100 MILLIGRAM(S): 500 INJECTION, POWDER, FOR SOLUTION INTRAMUSCULAR; INTRAVENOUS at 21:18

## 2022-09-14 RX ADMIN — HYDROMORPHONE HYDROCHLORIDE 0.25 MILLIGRAM(S): 2 INJECTION INTRAMUSCULAR; INTRAVENOUS; SUBCUTANEOUS at 16:43

## 2022-09-14 RX ADMIN — HYDROMORPHONE HYDROCHLORIDE 0.5 MILLIGRAM(S): 2 INJECTION INTRAMUSCULAR; INTRAVENOUS; SUBCUTANEOUS at 10:00

## 2022-09-14 RX ADMIN — TAMSULOSIN HYDROCHLORIDE 0.4 MILLIGRAM(S): 0.4 CAPSULE ORAL at 21:18

## 2022-09-14 RX ADMIN — HYDROMORPHONE HYDROCHLORIDE 0.5 MILLIGRAM(S): 2 INJECTION INTRAMUSCULAR; INTRAVENOUS; SUBCUTANEOUS at 13:01

## 2022-09-14 RX ADMIN — HYDROMORPHONE HYDROCHLORIDE 1 MILLIGRAM(S): 2 INJECTION INTRAMUSCULAR; INTRAVENOUS; SUBCUTANEOUS at 01:04

## 2022-09-14 RX ADMIN — Medication 3 MILLIGRAM(S): at 21:18

## 2022-09-14 RX ADMIN — HYDROMORPHONE HYDROCHLORIDE 0.25 MILLIGRAM(S): 2 INJECTION INTRAMUSCULAR; INTRAVENOUS; SUBCUTANEOUS at 21:17

## 2022-09-14 RX ADMIN — HYDROMORPHONE HYDROCHLORIDE 0.25 MILLIGRAM(S): 2 INJECTION INTRAMUSCULAR; INTRAVENOUS; SUBCUTANEOUS at 16:23

## 2022-09-14 RX ADMIN — HYDROMORPHONE HYDROCHLORIDE 0.5 MILLIGRAM(S): 2 INJECTION INTRAMUSCULAR; INTRAVENOUS; SUBCUTANEOUS at 09:26

## 2022-09-14 RX ADMIN — SODIUM CHLORIDE 125 MILLILITER(S): 9 INJECTION INTRAMUSCULAR; INTRAVENOUS; SUBCUTANEOUS at 16:42

## 2022-09-14 RX ADMIN — HYDROMORPHONE HYDROCHLORIDE 0.5 MILLIGRAM(S): 2 INJECTION INTRAMUSCULAR; INTRAVENOUS; SUBCUTANEOUS at 14:23

## 2022-09-14 NOTE — H&P ADULT - ASSESSMENT
51 y/o M presents with right flank pain     1. Intractable right flank pain secondary to 1.1 cm right UPJ calculus with new mild right hydronephrosis  - Admit to med/surg   - Start Flomax   - c/w Ceftriaxone   - Pain control: Tylenol, Dilaudid   - Zofran for nausea   - s/p 2.7L of NS in the ER, c/w 125 ml/hr overnight    - NPO, advance diet as tolerated  - f/u UCx, BCx x 2    - Urology consult - Dr. Roblero     2. Elevated lactic acid   - Lactate 4.8 -> 1.5   - s/p 2.7L of NS in the ER, c/w maintenance IVF     3. Hyperglycemia   - Glucose 146, monitor   - Ordered HbA1c     DVT ppx: SCDs

## 2022-09-14 NOTE — H&P ADULT - HISTORY OF PRESENT ILLNESS
51 y/o M with PMH kidney stone presented with right flank pain. Pt reports right flank rated 10/10 in intensity with radiation to the RLQ of the abdomen and groin. Reports dark urine for 2.5 weeks. Denies fevers, chills, chest pain, SOB, N/V, diarrhea/constipation. Pt had an outpt CT scan which showed a kidney stone. He was scheduled to have surgery with Dr. Roblero on October 3rd.       ER course: VSS. Labs: WBC 14.24, lactate 4.8 -> 1.5, glucose 146.     EKG: pending, f/u result     Imaging:  - CT abd/pelvis: 1.1 cm right UPJ calculus with new mild right hydronephrosis.  Additional nonobstructive left nephrolithiasis    Pt was given Ceftriaxone, 2700 ml of NS, Dilaudid, Toradol, Morphine. He is being admitted to med/surg for further management.

## 2022-09-14 NOTE — H&P ADULT - NSICDXFAMILYHX_GEN_ALL_CORE_FT
FAMILY HISTORY:  Father  Still living? Unknown  FH: aneurysm, Age at diagnosis: Age Unknown    Mother  Still living? Unknown  Family history of hepatitis C, Age at diagnosis: Age Unknown

## 2022-09-14 NOTE — H&P ADULT - NSHPPHYSICALEXAM_GEN_ALL_CORE
Vital Signs Last 24 Hrs  T(C): 36.4 (14 Sep 2022 00:57), Max: 36.9 (13 Sep 2022 17:39)  T(F): 97.5 (14 Sep 2022 00:57), Max: 98.4 (13 Sep 2022 17:39)  HR: 53 (14 Sep 2022 00:57) (53 - 60)  BP: 134/74 (14 Sep 2022 00:57) (107/56 - 134/74)  BP(mean): 74 (13 Sep 2022 23:16) (74 - 74)  RR: 19 (14 Sep 2022 00:57) (18 - 19)  SpO2: 100% (14 Sep 2022 00:57) (97% - 100%)    Parameters below as of 14 Sep 2022 00:57  Patient On (Oxygen Delivery Method): room air    General: Awake and alert, cooperative with exam. No acute distress.   Skin: Warm, dry, and pink.   Eyes: Pupils equal and reactive to light. Extraocular eye movements intact. No conjunctival injection, discharge, or scleral icterus.   HEENT: Atraumatic, normocephalic. Moist mucus membranes.   Cardiology: Normal S1, S2. No murmurs, rubs, or gallops. Regular rate and rhythm.   Respiratory: Lungs clear to ascultation bilaterally. Good air exchange. No wheezes, rales, or rhonchi. Normal chest expansion.   Gastrointestinal: Positive bowel sounds. Soft, non-tender, non-distended. No guarding, rigidity, or rebound tenderness. No hepatosplenomegaly. Right CVA tenderness   Musculoskeletal: 5/5 motor strength in all extremities. Normal range of motion.   Extremities: No peripheral edema bilaterally. Dorsalis pedis pulses 2+ bilaterally.   Neurological: A+Ox3 (person, place, and time). Cranial nerves 2-12 intact. Normal speech. No facial droop. No focal neurological deficits.  Psychiatric: Normal affect. Normal mood.

## 2022-09-14 NOTE — PROGRESS NOTE ADULT - ASSESSMENT
51 y/o M admitted for:    1. Nephrolithiasis  -  Intractable right flank pain secondary to 1.1 cm right UPJ calculus with new mild right hydronephrosis  - Continue Flomax   - c/w Ceftriaxone Day 2   - Pain control: Tylenol, Dilaudid   - Zofran for nausea   - s/p 2.7L of NS in the ER  - f/u UCx, BCx x 2    - Urology consulted, spoke w Dr. Pro    2. Elevated lactic acid   - Lactate 4.8 -> 1.5   - s/p 2.7L of NS in the ER, c/w maintenance IVF   - Resolved    3. Hyperglycemia    - F/U HbA1c     4. DVT ppx: SCDs   Advanced Directives: Full Code  Dispo: pending urological evaluation and possible intervention    *Above discussed w Dr. Bonilla

## 2022-09-14 NOTE — CONSULT NOTE ADULT - SUBJECTIVE AND OBJECTIVE BOX
CHIEF COMPLAINT:  Kidney stone    HISTORY OF PRESENT ILLNESS:  51 yo male admitted to the hospital after presenting with severe right flank pain and gross hematuria. Had some nausea and had vomiting today.   Denies dysuria, fever, chills or rigors.   On CT scan: : 1.1 cm calculus right UPJ junction with new mild right hydronephrosis.  Still has on and off flank pain but less in intensity.   Saw Dr Roblero as out patient for gross hematuria, was found to have kidney stone and was being scheduled as out patient for Ureteroscopy.   Has h/o kidney stone in the past. Father has kidney stone.          PAST MEDICAL & SURGICAL HISTORY:  Nephrolithiasis    S/P vasectomy      REVIEW OF SYSTEMS:  All other review of systems is negative unless indicated above.    MEDICATIONS  (STANDING):  cefTRIAXone   IVPB 1000 milliGRAM(s) IV Intermittent every 24 hours  naloxone Injectable 0.4 milliGRAM(s) IV Push once  tamsulosin 0.4 milliGRAM(s) Oral daily    MEDICATIONS  (PRN):  acetaminophen     Tablet .. 650 milliGRAM(s) Oral every 6 hours PRN Temp greater or equal to 38C (100.4F), Mild Pain (1 - 3)  aluminum hydroxide/magnesium hydroxide/simethicone Suspension 30 milliLiter(s) Oral every 4 hours PRN Dyspepsia  HYDROmorphone  Injectable 0.25 milliGRAM(s) IV Push every 4 hours PRN Moderate Pain (4 - 6)  HYDROmorphone  Injectable 0.5 milliGRAM(s) IV Push every 4 hours PRN Severe Pain (7 - 10)  melatonin 3 milliGRAM(s) Oral at bedtime PRN Insomnia  ondansetron Injectable 4 milliGRAM(s) IV Push every 8 hours PRN Nausea and/or Vomiting  ondansetron Injectable 4 milliGRAM(s) IV Push every 6 hours PRN Nausea and/or Vomiting  polyethylene glycol 3350 17 Gram(s) Oral daily PRN Constipation      Allergies    No Known Allergies    Intolerances        SOCIAL HISTORY:    FAMILY HISTORY:  FH: aneurysm (Father)    Family history of hepatitis C (Mother)        Vital Signs Last 24 Hrs  T(C): 36.8 (14 Sep 2022 15:49), Max: 36.8 (14 Sep 2022 08:09)  T(F): 98.2 (14 Sep 2022 15:49), Max: 98.3 (14 Sep 2022 08:09)  HR: 56 (14 Sep 2022 15:49) (50 - 60)  BP: 121/76 (14 Sep 2022 15:49) (107/56 - 134/74)  BP(mean): 74 (13 Sep 2022 23:16) (74 - 74)  RR: 18 (14 Sep 2022 15:49) (18 - 19)  SpO2: 96% (14 Sep 2022 15:49) (96% - 100%)    Parameters below as of 14 Sep 2022 15:49  Patient On (Oxygen Delivery Method): room air        PHYSICAL EXAM:    Constitutional: No acute distress  HEENT: EOMI, Normal Hearing  Neck: Supple  Back: No costovertebral angle tenderness  Respiratory: Normal respiratory effort    Cardiovascular: Normal peripheral circulation   Abd: Soft, non distended, non tender  Extremities: No peripheral edema  Neurological: No focal deficits  Psychiatric: Normal mood, normal affect  Musculoskeletal: Moving all 4 extremities  Skin: No rashes    LABS:                        12.0   9.48  )-----------( 273      ( 14 Sep 2022 06:16 )             36.1     09-14    140  |  110<H>  |  13  ----------------------------<  98  3.7   |  24  |  0.81    Ca    8.1<L>      14 Sep 2022 06:16    TPro  7.6  /  Alb  4.3  /  TBili  0.8  /  DBili  x   /  AST  10<L>  /  ALT  19  /  AlkPhos  77  09-13    PT/INR - ( 14 Sep 2022 06:16 )   PT: 13.7 sec;   INR: 1.18 ratio         PTT - ( 13 Sep 2022 18:40 )  PTT:27.4 sec  Urinalysis Basic - ( 13 Sep 2022 23:44 )    Color: Yellow / Appearance: Clear / S.025 / pH: x  Gluc: x / Ketone: Small  / Bili: Negative / Urobili: 1   Blood: x / Protein: 30 mg/dL / Nitrite: Negative   Leuk Esterase: Trace / RBC: >50 /HPF / WBC 3-5   Sq Epi: x / Non Sq Epi: Negative / Bacteria: Moderate      < from: CT Abdomen and Pelvis No Cont (22 @ 19:17) >  ACC: [id number] EXAM:  CT ABDOMEN AND PELVIS                          PROCEDURE DATE:  2022          INTERPRETATION:  CLINICAL INFORMATION: Right renal colic    COMPARISON: None.    CONTRAST/COMPLICATIONS:  IV Contrast: NONE  Oral Contrast: NONE  Complications: None reported at time of study completion    PROCEDURE:  CT of the Abdomen and Pelvis was performed.  Sagittal and coronal reformats were performed.    FINDINGS:  LOWER CHEST: Within normal limits.    LIVER: Too small to characterizehypodensity right hepatic lobe lobe.  BILE DUCTS: Normal caliber.  GALLBLADDER: Within normal limits.  SPLEEN: Within normal limits.  PANCREAS: Within normal limits.  ADRENALS: Within normal limits.  KIDNEYS/URETERS: 1.1 cm calculus right UPJ junction with new mild right   hydronephrosis. A 2 mm nonobstructive calculus left kidney similar to   prior    BLADDER: Within normal limits.  REPRODUCTIVE ORGANS: Normal    BOWEL: No bowel obstruction. Colonic diverticulosis without   diverticulitis. Appendix normal  PERITONEUM: No ascites.  VESSELS: Normal caliber.  RETROPERITONEUM/LYMPH NODES: No lymphadenopathy.  ABDOMINAL WALL: Fat-containing umbilical hernia  BONES: Within normal limits.    IMPRESSION:  1.1 cm right UPJ calculus with new mild right hydronephrosis.  Additional nonobstructive left nephrolithiasis    < end of copied text >

## 2022-09-14 NOTE — CONSULT NOTE ADULT - ASSESSMENT
Discussed treatment options and recommended Cystoscopy, retrograde pyelogram and right ureteral stent placement. Discussed will need definitive management of kidney stone at later date: ESWL Vs Ureteroscopy Vs Percutaneous nephrolithotomy. Risks and benefits of each were discussed.    Patient will prefer to proceed straight with Ureteroscopy. Mentioned will check for Insurance approval tomorrow AM and if approved will proceed with Ureteroscopy otherwise will Cystoscopy and ureteral stent placement.   Patient agreeable. [place] for OR.   NPO p MN.   Continue current care.

## 2022-09-15 ENCOUNTER — TRANSCRIPTION ENCOUNTER (OUTPATIENT)
Age: 52
End: 2022-09-15

## 2022-09-15 ENCOUNTER — RESULT REVIEW (OUTPATIENT)
Age: 52
End: 2022-09-15

## 2022-09-15 LAB
ANION GAP SERPL CALC-SCNC: 5 MMOL/L — SIGNIFICANT CHANGE UP (ref 5–17)
BUN SERPL-MCNC: 13 MG/DL — SIGNIFICANT CHANGE UP (ref 7–23)
CALCIUM SERPL-MCNC: 9 MG/DL — SIGNIFICANT CHANGE UP (ref 8.5–10.1)
CHLORIDE SERPL-SCNC: 109 MMOL/L — HIGH (ref 96–108)
CO2 SERPL-SCNC: 25 MMOL/L — SIGNIFICANT CHANGE UP (ref 22–31)
CREAT SERPL-MCNC: 0.89 MG/DL — SIGNIFICANT CHANGE UP (ref 0.5–1.3)
CULTURE RESULTS: SIGNIFICANT CHANGE UP
EGFR: 104 ML/MIN/1.73M2 — SIGNIFICANT CHANGE UP
GLUCOSE SERPL-MCNC: 98 MG/DL — SIGNIFICANT CHANGE UP (ref 70–99)
HCT VFR BLD CALC: 41.1 % — SIGNIFICANT CHANGE UP (ref 39–50)
HGB BLD-MCNC: 13.4 G/DL — SIGNIFICANT CHANGE UP (ref 13–17)
MAGNESIUM SERPL-MCNC: 2.5 MG/DL — SIGNIFICANT CHANGE UP (ref 1.6–2.6)
MCHC RBC-ENTMCNC: 28.2 PG — SIGNIFICANT CHANGE UP (ref 27–34)
MCHC RBC-ENTMCNC: 32.6 GM/DL — SIGNIFICANT CHANGE UP (ref 32–36)
MCV RBC AUTO: 86.5 FL — SIGNIFICANT CHANGE UP (ref 80–100)
PHOSPHATE SERPL-MCNC: 2.3 MG/DL — LOW (ref 2.5–4.5)
PLATELET # BLD AUTO: 295 K/UL — SIGNIFICANT CHANGE UP (ref 150–400)
POTASSIUM SERPL-MCNC: 3.9 MMOL/L — SIGNIFICANT CHANGE UP (ref 3.5–5.3)
POTASSIUM SERPL-SCNC: 3.9 MMOL/L — SIGNIFICANT CHANGE UP (ref 3.5–5.3)
RBC # BLD: 4.75 M/UL — SIGNIFICANT CHANGE UP (ref 4.2–5.8)
RBC # FLD: 13.2 % — SIGNIFICANT CHANGE UP (ref 10.3–14.5)
SODIUM SERPL-SCNC: 139 MMOL/L — SIGNIFICANT CHANGE UP (ref 135–145)
SPECIMEN SOURCE: SIGNIFICANT CHANGE UP
WBC # BLD: 8.37 K/UL — SIGNIFICANT CHANGE UP (ref 3.8–10.5)
WBC # FLD AUTO: 8.37 K/UL — SIGNIFICANT CHANGE UP (ref 3.8–10.5)

## 2022-09-15 PROCEDURE — 52356 CYSTO/URETERO W/LITHOTRIPSY: CPT | Mod: RT

## 2022-09-15 PROCEDURE — 99233 SBSQ HOSP IP/OBS HIGH 50: CPT | Mod: GC

## 2022-09-15 PROCEDURE — 99232 SBSQ HOSP IP/OBS MODERATE 35: CPT | Mod: 25

## 2022-09-15 PROCEDURE — 88300 SURGICAL PATH GROSS: CPT | Mod: 26

## 2022-09-15 RX ORDER — OXYCODONE HYDROCHLORIDE 5 MG/1
5 TABLET ORAL ONCE
Refills: 0 | Status: DISCONTINUED | OUTPATIENT
Start: 2022-09-15 | End: 2022-09-15

## 2022-09-15 RX ORDER — PHENAZOPYRIDINE HCL 100 MG
100 TABLET ORAL THREE TIMES A DAY
Refills: 0 | Status: DISCONTINUED | OUTPATIENT
Start: 2022-09-15 | End: 2022-09-16

## 2022-09-15 RX ORDER — ONDANSETRON 8 MG/1
4 TABLET, FILM COATED ORAL EVERY 6 HOURS
Refills: 0 | Status: DISCONTINUED | OUTPATIENT
Start: 2022-09-15 | End: 2022-09-15

## 2022-09-15 RX ORDER — POTASSIUM PHOSPHATE, MONOBASIC POTASSIUM PHOSPHATE, DIBASIC 236; 224 MG/ML; MG/ML
15 INJECTION, SOLUTION INTRAVENOUS ONCE
Refills: 0 | Status: COMPLETED | OUTPATIENT
Start: 2022-09-15 | End: 2022-09-15

## 2022-09-15 RX ORDER — PHENAZOPYRIDINE HCL 100 MG
200 TABLET ORAL ONCE
Refills: 0 | Status: DISCONTINUED | OUTPATIENT
Start: 2022-09-15 | End: 2022-09-16

## 2022-09-15 RX ORDER — SODIUM CHLORIDE 9 MG/ML
1000 INJECTION, SOLUTION INTRAVENOUS
Refills: 0 | Status: DISCONTINUED | OUTPATIENT
Start: 2022-09-15 | End: 2022-09-15

## 2022-09-15 RX ORDER — FENTANYL CITRATE 50 UG/ML
50 INJECTION INTRAVENOUS EVERY 6 HOURS
Refills: 0 | Status: DISCONTINUED | OUTPATIENT
Start: 2022-09-15 | End: 2022-09-15

## 2022-09-15 RX ADMIN — HYDROMORPHONE HYDROCHLORIDE 0.5 MILLIGRAM(S): 2 INJECTION INTRAMUSCULAR; INTRAVENOUS; SUBCUTANEOUS at 12:34

## 2022-09-15 RX ADMIN — OXYCODONE HYDROCHLORIDE 5 MILLIGRAM(S): 5 TABLET ORAL at 17:31

## 2022-09-15 RX ADMIN — TAMSULOSIN HYDROCHLORIDE 0.4 MILLIGRAM(S): 0.4 CAPSULE ORAL at 22:15

## 2022-09-15 RX ADMIN — Medication 100 MILLIGRAM(S): at 22:15

## 2022-09-15 RX ADMIN — Medication 650 MILLIGRAM(S): at 21:39

## 2022-09-15 RX ADMIN — CEFTRIAXONE 100 MILLIGRAM(S): 500 INJECTION, POWDER, FOR SOLUTION INTRAMUSCULAR; INTRAVENOUS at 15:29

## 2022-09-15 RX ADMIN — POTASSIUM PHOSPHATE, MONOBASIC POTASSIUM PHOSPHATE, DIBASIC 62.5 MILLIMOLE(S): 236; 224 INJECTION, SOLUTION INTRAVENOUS at 20:07

## 2022-09-15 NOTE — BRIEF OPERATIVE NOTE - NSICDXBRIEFPROCEDURE_GEN_ALL_CORE_FT
PROCEDURES:  Ureteroscopy, with laser lithotripsy and stent insertion 15-Sep-2022 17:39:22 Right Last Pro

## 2022-09-15 NOTE — BRIEF OPERATIVE NOTE - COMMENTS
1. Can be discharged tomorrow if stable and improving labs  2. Continue Flomax  3. Pyridium 100 mg TID x 3 days  4. Tylenol/Ibuprofen for pain/discomfort   5. Will schedule for out patient Cystoscopy and ureteral stent removal

## 2022-09-15 NOTE — BRIEF OPERATIVE NOTE - NSICDXBRIEFPOSTOP_GEN_ALL_CORE_FT
POST-OP DIAGNOSIS:  Renal colic 15-Sep-2022 17:40:10  Last Pro  Kidney stone on right side 15-Sep-2022 17:39:49  Last Pro

## 2022-09-15 NOTE — PROGRESS NOTE ADULT - ATTENDING COMMENTS
51 y/o M admitted for:    1. Nephrolithiasis  -  Intractable right flank pain secondary to 1.1 cm right UPJ calculus with new mild right hydronephrosis  - Continue Flomax   - c/w Ceftriaxone Day 2   - Pain control: Tylenol, Dilaudid   - Zofran for nausea   - s/p 2.7L of NS in the ER  - f/u UCx, BCx x 2    - Urology consulted, spoke w Dr. Pro
51 y/o M with PMH of nephrolithiasis p/w with R flank pain radiating to RLQ abd and groin area. CT showed obstructing nephrolithiasis in the R kidney. Admitted for:    1. Nephrolithiasis  -  Intractable right flank pain secondary to 1.1 cm right UPJ calculus with new mild right hydronephrosis  - Continue Flomax   - c/w Ceftriaxone Day 3   - Pain control: Tylenol, Dilaudid   - Zofran for nausea   - s/p 2.7L of NS in the ER  - UCx with urogenital pratik, BCx x 2 with no growth up to date  - Urology consulted, spoke w Dr. Pro  - Scheduled for ureteroscopy and possible stent placement on 9/15

## 2022-09-15 NOTE — PROGRESS NOTE ADULT - ASSESSMENT
Again discussed treatment options and mentioned per  all procedures related to ED visit will be covered.   Patient agreeable to proceed with right ureteroscopy, laser lithotripsy, stone extraction and stent placement.   Discussed risks and benefits. Discussed the procedure and the post operative course. Informed consent obtained.

## 2022-09-15 NOTE — PROGRESS NOTE ADULT - SUBJECTIVE AND OBJECTIVE BOX
Patient is a 52y old  Male who presents with a chief complaint of Right flank pain (15 Sep 2022 14:10)  No longer has pain.       Vital Signs Last 24 Hrs  T(C): 36.8 (15 Sep 2022 17:10), Max: 36.8 (15 Sep 2022 07:52)  T(F): 98.3 (15 Sep 2022 17:10), Max: 98.3 (15 Sep 2022 17:10)  HR: 70 (15 Sep 2022 17:30) (60 - 70)  BP: 118/58 (15 Sep 2022 17:30) (110/54 - 137/78)  BP(mean): --  RR: 12 (15 Sep 2022 17:30) (11 - 18)  SpO2: 100% (15 Sep 2022 17:30) (99% - 100%)    Parameters below as of 15 Sep 2022 17:10  Patient On (Oxygen Delivery Method): nasal cannula  O2 Flow (L/min): 3  AAO x 3   Normal respiratory effort  Normal peripheral circulation  Abdomen- Soft, ND, NT       LABS:                        13.4   8.37  )-----------( 295      ( 15 Sep 2022 08:57 )             41.1     09-15    139  |  109<H>  |  13  ----------------------------<  98  3.9   |  25  |  0.89    Ca    9.0      15 Sep 2022 08:57  Phos  2.3     09-15  Mg     2.5     09-15    TPro  7.6  /  Alb  4.3  /  TBili  0.8  /  DBili  x   /  AST  10<L>  /  ALT  19  /  AlkPhos  77  09-13    PT/INR - ( 14 Sep 2022 06:16 )   PT: 13.7 sec;   INR: 1.18 ratio         PTT - ( 13 Sep 2022 18:40 )  PTT:27.4 sec  Urinalysis Basic - ( 13 Sep 2022 23:44 )    Color: Yellow / Appearance: Clear / S.025 / pH: x  Gluc: x / Ketone: Small  / Bili: Negative / Urobili: 1   Blood: x / Protein: 30 mg/dL / Nitrite: Negative   Leuk Esterase: Trace / RBC: >50 /HPF / WBC 3-5   Sq Epi: x / Non Sq Epi: Negative / Bacteria: Moderate            Culture Results:   <10,000 CFU/mL Normal Urogenital Renata (22 @ 23:44)  Culture Results:   No growth to date. (22 @ 21:58)  Culture Results:   No growth to date. (22 @ 21:56)  
HPI:   53 y/o M with PMH kidney stone presented with right flank pain (10/10) with radiation to the RLQ of the abdomen and groin. Reported dark urine for 2.5 weeks. Pt had an outpt CT scan which showed a kidney stone. He was scheduled to have surgery with Dr. Roblero on October 3rd, but then switched to be seen by Dr. Monroy, had scheduled appt 09/14 to establish care. ER course: VSS. Labs: WBC 14.24, lactate 4.8 -> 1.5, glucose 146. CT abd/pelvis: 1.1 cm right UPJ calculus with new mild right hydronephrosis. Additional nonobstructive left nephrolithiasis. In ED, Pt was given Ceftriaxone, 2700 ml of NS, Dilaudid, Toradol, Morphine. He was admitted to med/surg for further management.     09/14: Patient seen at bedside w wife, states he's been dealing w this flank pain for a month now associated w dark urine. Denies fevers, chills or syncopal episodes.     9/15: Pt continue to have intermittent L flank pain. Scheduled for ureteroscopy and possible stent placement this afternoon.    REVIEW OF SYSTEMS:    CONSTITUTIONAL: No weakness, fevers or chills  EYES/ENT: No visual changes;  No vertigo or throat pain   NECK: No pain or stiffness  RESPIRATORY: No cough, wheezing, hemoptysis; No shortness of breath  CARDIOVASCULAR: No chest pain or palpitations  GASTROINTESTINAL: No abdominal or epigastric pain. No nausea, vomiting; No diarrhea or constipation. No melena or hematochezia.  GENITOURINARY: + R flank pain & hematuria  NEUROLOGICAL: No numbness or weakness  SKIN: No itching, rashes    Vitals:  ============  T(F): 98.2 (15 Sep 2022 07:52), Max: 98.2 (14 Sep 2022 15:49)  HR: 60 (15 Sep 2022 07:52)  BP: 128/63 (15 Sep 2022 07:52)  RR: 18 (15 Sep 2022 07:52)  SpO2: 100% (15 Sep 2022 07:52) (96% - 100%)  temp max in last 48H T(F): , Max: 98.4 (09-13-22 @ 17:39)    =======================================================  Current Antibiotics:  cefTRIAXone   IVPB 1000 milliGRAM(s) IV Intermittent every 24 hours    Other medications:  naloxone Injectable 0.4 milliGRAM(s) IV Push once  tamsulosin 0.4 milliGRAM(s) Oral daily      =======================================================  Physical Exam   Gen: NAD, comfortable  HENT: atraumatic head and ears, no gross abnormalities of ears, mucous membranes moist, no oral lesions, neck supple without masses/goiter/lymphadenopathy  CV: RRR, nl s1/s2, no M/R/G  Pulm: nl respiratory effort, CTAB, no wheezes/crackles/rhonchi  Back: tenderness to palpation of R flank, no scoliosis, lordosis, or kyphosis  Abd: normoactive bowel sounds in all 4 quadrants, soft, nontender, nondistended, no rebound, no guarding, no masses  Extremities: no pedal edema, pedal pulses palpable   Skin: nl warm and dry, no wounds   Neuro: A&Ox3, answering questions appropriately, PERRL, EOMI, face symmetric, sensation equal bilaterally in face, tongue midline, no dysarthria, 5/5 strength in upper and lower extremities bilaterally, sensation intact in upper and lower extremities bilaterally, nl finger to nose, and nl heel to shin   Pysch: no depression, no SI, no HI    Labs:                        13.4   8.37  )-----------( 295      ( 15 Sep 2022 08:57 )             41.1     09-15    139  |  109<H>  |  13  ----------------------------<  98  3.9   |  25  |  0.89    Ca    9.0      15 Sep 2022 08:57  Phos  2.3     09-15  Mg     2.5     09-15    TPro  7.6  /  Alb  4.3  /  TBili  0.8  /  DBili  x   /  AST  10<L>  /  ALT  19  /  AlkPhos  77  09-13      Culture - Urine (collected 09-13-22 @ 23:44)  Source: Clean Catch None  Final Report (09-15-22 @ 07:29):    <10,000 CFU/mL Normal Urogenital Renata    Culture - Blood (collected 09-13-22 @ 21:58)  Source: .Blood None    Culture - Blood (collected 09-13-22 @ 21:56)  Source: .Blood None      Creatinine, Serum: 0.89 mg/dL (09-15-22 @ 08:57)  Creatinine, Serum: 0.81 mg/dL (09-14-22 @ 06:16)  Creatinine, Serum: 1.30 mg/dL (09-13-22 @ 18:39)            WBC Count: 8.37 K/uL (09-15-22 @ 08:57)  WBC Count: 9.48 K/uL (09-14-22 @ 06:16)  WBC Count: 14.24 K/uL (09-13-22 @ 18:39)    SARS-CoV-2 Result: NotDetec (09-13-22 @ 18:39)      Alkaline Phosphatase, Serum: 77 U/L (09-13-22 @ 18:39)  Alanine Aminotransferase (ALT/SGPT): 19 U/L (09-13-22 @ 18:39)  Aspartate Aminotransferase (AST/SGOT): 10 U/L (09-13-22 @ 18:39)  Bilirubin Total, Serum: 0.8 mg/dL (09-13-22 @ 18:39)      CT Abdomen and Pelvis No Cont (09.13.22 @ 19:17)  IMPRESSION:  1.1 cm right UPJ calculus with new mild right hydronephrosis.  Additional nonobstructive left nephrolithiasis     
HPI: 51 y/o M with PMH kidney stone presented with right flank pain. Pt reported right flank rated 10/10 in intensity with radiation to the RLQ of the abdomen and groin. Reported dark urine for 2.5 weeks. Pt had an outpt CT scan which showed a kidney stone. He was scheduled to have surgery with Dr. Roblero on October 3rd, but then switched to be seen by Dr. Monroy, had scheduled appt 09/14 to establish care. ER course: VSS. Labs: WBC 14.24, lactate 4.8 -> 1.5, glucose 146. CT abd/pelvis: 1.1 cm right UPJ calculus with new mild right hydronephrosis. Additional nonobstructive left nephrolithiasis. In ED, Pt was given Ceftriaxone, 2700 ml of NS, Dilaudid, Toradol, Morphine. He was admitted to med/surg for further management.     09/14: Patient seen at bedside w wife, states he's been dealing w this flank pain for a month now associated w dark urine. Denies fevers, chills or syncopal episodes.     REVIEW OF SYSTEMS:    CONSTITUTIONAL: No weakness, fevers or chills  EYES/ENT: No visual changes;  No vertigo or throat pain   NECK: No pain or stiffness  RESPIRATORY: No cough, wheezing, hemoptysis; No shortness of breath  CARDIOVASCULAR: No chest pain or palpitations  GASTROINTESTINAL: No abdominal or epigastric pain. No nausea, vomiting, or hematemesis; No diarrhea or constipation. No melena or hematochezia.  GENITOURINARY: + R flank pain & hematuria  NEUROLOGICAL: No numbness or weakness  SKIN: No itching, rashes    Vitals:  ============  T(F): 98.2 (14 Sep 2022 15:49), Max: 98.4 (13 Sep 2022 17:39)  HR: 56 (14 Sep 2022 15:49)  BP: 121/76 (14 Sep 2022 15:49)  RR: 18 (14 Sep 2022 15:49)  SpO2: 96% (14 Sep 2022 15:49) (96% - 100%)  temp max in last 48H T(F): , Max: 98.4 (09-13-22 @ 17:39)    Physical Exam   Gen: NAD, comfortable  HENT: atraumatic head and ears, no gross abnormalities of ears, mucous membranes moist, no oral lesions, neck supple without masses/goiter/lymphadenopathy  CV: RRR, nl s1/s2, no M/R/G  Pulm: nl respiratory effort, CTAB, no wheezes/crackles/rhonchi  Back: tenderness to palpation of R flank, no scoliosis, lordosis, or kyphosis  Abd: normoactive bowel sounds in all 4 quadrants, soft, nontender, nondistended, no rebound, no guarding, no masses  Extremities: no pedal edema, pedal pulses palpable   Skin: nl warm and dry, no wounds   Neuro: A&Ox3, answering questions appropriately, PERRL, EOMI, face symmetric, sensation equal bilaterally in face, tongue midline, no dysarthria, 5/5 strength in upper and lower extremities bilaterally, sensation intact in upper and lower extremities bilaterally, nl finger to nose, and nl heel to shin   Pysch: no depression, no SI, no HI      =======================================================  Current Antibiotics:  cefTRIAXone   IVPB 1000 milliGRAM(s) IV Intermittent every 24 hours    Other medications:  naloxone Injectable 0.4 milliGRAM(s) IV Push once  tamsulosin 0.4 milliGRAM(s) Oral daily      =======================================================  Labs:                        12.0   9.48  )-----------( 273      ( 14 Sep 2022 06:16 )             36.1     09-14    140  |  110<H>  |  13  ----------------------------<  98  3.7   |  24  |  0.81    Ca    8.1<L>      14 Sep 2022 06:16    TPro  7.6  /  Alb  4.3  /  TBili  0.8  /  DBili  x   /  AST  10<L>  /  ALT  19  /  AlkPhos  77  09-13      Creatinine, Serum: 0.81 mg/dL (09-14-22 @ 06:16)  Creatinine, Serum: 1.30 mg/dL (09-13-22 @ 18:39)            WBC Count: 9.48 K/uL (09-14-22 @ 06:16)  WBC Count: 14.24 K/uL (09-13-22 @ 18:39)    SARS-CoV-2 Result: NotDetec (09-13-22 @ 18:39)      Alkaline Phosphatase, Serum: 77 U/L (09-13-22 @ 18:39)  Alanine Aminotransferase (ALT/SGPT): 19 U/L (09-13-22 @ 18:39)  Aspartate Aminotransferase (AST/SGOT): 10 U/L (09-13-22 @ 18:39)  Bilirubin Total, Serum: 0.8 mg/dL (09-13-22 @ 18:39)        < from: CT Abdomen and Pelvis No Cont (09.13.22 @ 19:17) >  IMPRESSION:  1.1 cm right UPJ calculus with new mild right hydronephrosis.  Additional nonobstructive left nephrolithiasis      < end of copied text >

## 2022-09-15 NOTE — BRIEF OPERATIVE NOTE - NSICDXBRIEFPREOP_GEN_ALL_CORE_FT
PRE-OP DIAGNOSIS:  Kidney stone on right side 15-Sep-2022 17:39:39  Last Pro  Renal colic 15-Sep-2022 17:40:03  Last Pro

## 2022-09-15 NOTE — PROGRESS NOTE ADULT - ASSESSMENT
· Assessment	  53 y/o M with PMH of nephrolithiasis p/w with R flank pain radiating to RLQ abd and groin area. CT showed obstructing nephrolithiasis in the R kidney. Admitted for:    1. Nephrolithiasis  -  Intractable right flank pain secondary to 1.1 cm right UPJ calculus with new mild right hydronephrosis  - Continue Flomax   - c/w Ceftriaxone Day 3   - Pain control: Tylenol, Dilaudid   - Zofran for nausea   - s/p 2.7L of NS in the ER  - UCx with urogenital pratik, BCx x 2 with no growth up to date  - Urology consulted, spoke w Dr. Pro  - Scheduled for urethroscopy and possible stent placement on 9/15     2. Elevated lactic acid   - Lactate 4.8 -> 1.5   - s/p 2.7L of NS in the ER, c/w maintenance IVF   - Resolved    3. Hyperglycemia    - Glc 146 on admit(9/13)  - 98 last two days    4. DVT ppx: SCDs   Advanced Directives: Full Code  Dispo: pending urological evaluation and possible intervention    *Above discussed w Dr. Bonilla   · Assessment	  53 y/o M with PMH of nephrolithiasis p/w with R flank pain radiating to RLQ abd and groin area. CT showed obstructing nephrolithiasis in the R kidney. Admitted for:    1. Nephrolithiasis  -  Intractable right flank pain secondary to 1.1 cm right UPJ calculus with new mild right hydronephrosis  - Continue Flomax   - c/w Ceftriaxone Day 3   - Pain control: Tylenol, Dilaudid   - Zofran for nausea   - s/p 2.7L of NS in the ER  - UCx with urogenital pratik, BCx x 2 with no growth up to date  - Urology consulted, spoke w Dr. Pro  - Scheduled for ureteroscopy and possible stent placement on 9/15     2. Elevated lactic acid   - Lactate 4.8 -> 1.5   - s/p 2.7L of NS in the ER, c/w maintenance IVF   - Resolved    3. Hyperglycemia    - Glc 146 on admit(9/13)  - 98 last two days    4. DVT ppx: SCDs   Advanced Directives: Full Code  Dispo: pending urological evaluation and possible intervention    *Above discussed w Dr. Bonilla

## 2022-09-16 ENCOUNTER — TRANSCRIPTION ENCOUNTER (OUTPATIENT)
Age: 52
End: 2022-09-16

## 2022-09-16 VITALS
OXYGEN SATURATION: 95 % | TEMPERATURE: 98 F | SYSTOLIC BLOOD PRESSURE: 125 MMHG | RESPIRATION RATE: 17 BRPM | HEART RATE: 72 BPM | DIASTOLIC BLOOD PRESSURE: 72 MMHG

## 2022-09-16 DIAGNOSIS — N20.9 URINARY CALCULUS, UNSPECIFIED: ICD-10-CM

## 2022-09-16 PROBLEM — N20.0 CALCULUS OF KIDNEY: Chronic | Status: ACTIVE | Noted: 2022-09-14

## 2022-09-16 LAB
ANION GAP SERPL CALC-SCNC: 5 MMOL/L — SIGNIFICANT CHANGE UP (ref 5–17)
BUN SERPL-MCNC: 13 MG/DL — SIGNIFICANT CHANGE UP (ref 7–23)
CALCIUM SERPL-MCNC: 9 MG/DL — SIGNIFICANT CHANGE UP (ref 8.5–10.1)
CHLORIDE SERPL-SCNC: 107 MMOL/L — SIGNIFICANT CHANGE UP (ref 96–108)
CO2 SERPL-SCNC: 27 MMOL/L — SIGNIFICANT CHANGE UP (ref 22–31)
CREAT SERPL-MCNC: 0.96 MG/DL — SIGNIFICANT CHANGE UP (ref 0.5–1.3)
EGFR: 96 ML/MIN/1.73M2 — SIGNIFICANT CHANGE UP
GLUCOSE SERPL-MCNC: 94 MG/DL — SIGNIFICANT CHANGE UP (ref 70–99)
HCT VFR BLD CALC: 41 % — SIGNIFICANT CHANGE UP (ref 39–50)
HGB BLD-MCNC: 13.6 G/DL — SIGNIFICANT CHANGE UP (ref 13–17)
MAGNESIUM SERPL-MCNC: 2.4 MG/DL — SIGNIFICANT CHANGE UP (ref 1.6–2.6)
MCHC RBC-ENTMCNC: 28.3 PG — SIGNIFICANT CHANGE UP (ref 27–34)
MCHC RBC-ENTMCNC: 33.2 GM/DL — SIGNIFICANT CHANGE UP (ref 32–36)
MCV RBC AUTO: 85.2 FL — SIGNIFICANT CHANGE UP (ref 80–100)
PHOSPHATE SERPL-MCNC: 2.6 MG/DL — SIGNIFICANT CHANGE UP (ref 2.5–4.5)
PLATELET # BLD AUTO: 306 K/UL — SIGNIFICANT CHANGE UP (ref 150–400)
POTASSIUM SERPL-MCNC: 3.8 MMOL/L — SIGNIFICANT CHANGE UP (ref 3.5–5.3)
POTASSIUM SERPL-SCNC: 3.8 MMOL/L — SIGNIFICANT CHANGE UP (ref 3.5–5.3)
RBC # BLD: 4.81 M/UL — SIGNIFICANT CHANGE UP (ref 4.2–5.8)
RBC # FLD: 13 % — SIGNIFICANT CHANGE UP (ref 10.3–14.5)
SODIUM SERPL-SCNC: 139 MMOL/L — SIGNIFICANT CHANGE UP (ref 135–145)
WBC # BLD: 12.21 K/UL — HIGH (ref 3.8–10.5)
WBC # FLD AUTO: 12.21 K/UL — HIGH (ref 3.8–10.5)

## 2022-09-16 PROCEDURE — 99238 HOSP IP/OBS DSCHRG MGMT 30/<: CPT | Mod: GC

## 2022-09-16 RX ORDER — TAMSULOSIN HYDROCHLORIDE 0.4 MG/1
1 CAPSULE ORAL
Qty: 30 | Refills: 0
Start: 2022-09-16 | End: 2022-10-15

## 2022-09-16 RX ORDER — ACETAMINOPHEN 500 MG
2 TABLET ORAL
Qty: 32 | Refills: 0
Start: 2022-09-16 | End: 2022-09-19

## 2022-09-16 RX ORDER — PHENAZOPYRIDINE HCL 100 MG
1 TABLET ORAL
Qty: 9 | Refills: 0
Start: 2022-09-16 | End: 2022-09-18

## 2022-09-16 RX ADMIN — Medication 100 MILLIGRAM(S): at 06:21

## 2022-09-16 RX ADMIN — Medication 100 MILLIGRAM(S): at 13:55

## 2022-09-16 NOTE — DISCHARGE NOTE PROVIDER - HOSPITAL COURSE
53 y/o M with PMH kidney stone presented with right flank pain with radiation to the RLQ of the abdomen and groin and dark urine. In the ED, patient presented with stable vitals and elevated WBC. Imaging showed obstructive calculus in the UPJ with new mild right hydronephrosis in the right kidney and a nonobstructive left nephrolithiasis. Pt was treated with Ceftriaxone, IVF, and pain medication. He was then admitted to med/surg for further management of right flank pain secondary to obstructive kidney stone. Antibiotic was continued. Blood culture with no growth until the day of discharge. Urine culture with normal urogenital pratik.  Urology was consulted and right ureteroscopy, laser lithotripsy, stone extraction, and stent placement was performed without complication. On the day of discharge patient medically cleared to return home.     9/16/22  Pt states of feeling better. No pain at this time.     Physical Exam   Gen: NAD, comfortable  HENT: atraumatic head and ears, no gross abnormalities of ears, mucous membranes moist, no oral lesions, neck supple without masses/goiter/lymphadenopathy  CV: RRR, nl s1/s2, no M/R/G  Pulm: nl respiratory effort, CTAB, no wheezes/crackles/rhonchi  Back: no scoliosis, lordosis, or kyphosis  Abd: normoactive bowel sounds in all 4 quadrants, soft, nontender, nondistended, no rebound, no guarding, no masses  Extremities: no pedal edema, pedal pulses palpable   Skin: nl warm and dry, no wounds   Neuro: A&Ox3, answering questions appropriately, PERRL, EOMI, face symmetric, sensation equal bilaterally in face, tongue midline, no dysarthria, 5/5 strength in upper and lower extremities bilaterally, sensation intact in upper and lower extremities bilaterally, nl finger to nose, and nl heel to shin   Pysch: no depression, no SI, no HI 53 y/o M with PMH kidney stone presented with right flank pain with radiation to the RLQ of the abdomen and groin and dark urine. In the ED, patient presented with stable vitals and elevated WBC. Imaging showed obstructive calculus in the UPJ with new mild right hydronephrosis in the right kidney and a nonobstructive left nephrolithiasis. Pt was treated with Ceftriaxone, IVF, and pain medication. He was then admitted to med/surg for further management of right flank pain secondary to obstructive kidney stone. Antibiotic was continued. Blood culture with no growth until the day of discharge. Urine culture with normal urogenital pratik.  Urology was consulted and right ureteroscopy, laser lithotripsy, stone extraction, and stent placement was performed without complication. On the day of discharge patient was pain free and medically cleared to return home.     9/16/22  Pt states of feeling better. No pain at this time.     Physical Exam   Gen: NAD, comfortable  HENT: atraumatic head and ears, no gross abnormalities of ears, mucous membranes moist, no oral lesions, neck supple without masses/goiter/lymphadenopathy  CV: RRR, nl s1/s2, no M/R/G  Pulm: nl respiratory effort, CTAB, no wheezes/crackles/rhonchi  Back: no R flank pain. no scoliosis, lordosis, or kyphosis  Abd: normoactive bowel sounds in all 4 quadrants, soft, nontender, nondistended, no rebound, no guarding, no masses  Extremities: no pedal edema, pedal pulses palpable   Skin: nl warm and dry, no wounds   Neuro: A&Ox3, answering questions appropriately, PERRL, EOMI, face symmetric, sensation equal bilaterally in face, tongue midline, no dysarthria, 5/5 strength in upper and lower extremities bilaterally, sensation intact in upper and lower extremities bilaterally, nl finger to nose, and nl heel to shin   Pysch: no depression, no SI, no HI 53 y/o M with PMH kidney stone presented to HH/E 2/2 right flank pain with radiation to the RLQ of the abdomen and groin and dark urine. Imaging showed obstructive calculus in the UPJ with new mild right hydronephrosis in the right kidney and a nonobstructive left nephrolithiasis. ED course vitals were stable and had  elevated WBC. Ceftriaxone, IVF, and pain medication were administered and admitted to Med/surg fort further management . . On the fontenot antibiotic was continued. Urology was consulted and right ureteroscopy, laser lithotripsy, stone extraction, and stent placement was performed without complication. Blood culture with no growth until the day of discharge. Urine culture with normal urogenital pratik. On the day of discharge patient was pain free and medically cleared to return home. Pt has to follow-up with Urologist  in 1-2  weeks time for stent removal .      9/16/22  Pt states of feeling better. No pain at this time. Vitals and examination wnl     Vital Signs Last 24 Hrs  T(C): 36.6 (16 Sep 2022 08:38), Max: 36.8 (15 Sep 2022 17:10)  T(F): 97.8 (16 Sep 2022 08:38), Max: 98.3 (15 Sep 2022 17:10)  HR: 72 (16 Sep 2022 08:38) (58 - 79)  BP: 125/72 (16 Sep 2022 08:38) (118/58 - 137/78)  BP(mean): --  RR: 17 (16 Sep 2022 08:38) (11 - 18)  SpO2: 95% (16 Sep 2022 08:38) (95% - 100%)    Parameters below as of 16 Sep 2022 08:38  Patient On (Oxygen Delivery Method): room air    Physical Exam   Gen: NAD, comfortable  HENT: atraumatic head and ears, no gross abnormalities of ears, mucous membranes moist, no oral lesions, neck supple without masses/goiter/lymphadenopathy  CV: RRR, nl s1/s2, no M/R/G  Pulm: nl respiratory effort, CTAB, no wheezes/crackles/rhonchi  Back: no R flank pain. no scoliosis, lordosis, or kyphosis  Abd: normoactive bowel sounds in all 4 quadrants, soft, nontender, nondistended, no rebound, no guarding, no masses  Extremities: no pedal edema, pedal pulses palpable   Skin: nl warm and dry, no wounds   Neuro: A&Ox3, answering questions appropriately, PERRL, EOMI, face symmetric, sensation equal bilaterally in face, tongue midline, no dysarthria, 5/5 strength in upper and lower extremities bilaterally, sensation intact in upper and lower extremities bilaterally, nl finger to nose, and nl heel to shin   Pysch: no depression, no SI, no HI 51 y/o M with PMH kidney stone presented to /ED2/2 right flank pain with radiation to the RLQ of the abdomen and groin and dark urine. Imaging showed obstructive calculus in the UPJ with new mild right hydronephrosis in the right kidney and a nonobstructive left nephrolithiasis. ED course vitals were stable and had  elevated WBC. Ceftriaxone, IVF, and pain medication were administered and admitted to Med/surg fort further management . . On the fontenot antibiotic was continued. Urology was consulted and right ureteroscopy, laser lithotripsy, stone extraction, and stent placement was performed without complication. Blood culture with no growth until the day of discharge. Urine culture with normal urogenital pratik. On the day of discharge patient was pain free and medically cleared to return home. Pt has to follow-up with Urologist  in 1-2  weeks time for stent removal .      9/16/22  Pt states of feeling better. No pain at this time. Vitals and examination wnl     Vital Signs Last 24 Hrs  T(C): 36.6 (16 Sep 2022 08:38), Max: 36.8 (15 Sep 2022 17:10)  T(F): 97.8 (16 Sep 2022 08:38), Max: 98.3 (15 Sep 2022 17:10)  HR: 72 (16 Sep 2022 08:38) (58 - 79)  BP: 125/72 (16 Sep 2022 08:38) (118/58 - 137/78)  BP(mean): --  RR: 17 (16 Sep 2022 08:38) (11 - 18)  SpO2: 95% (16 Sep 2022 08:38) (95% - 100%)    Parameters below as of 16 Sep 2022 08:38  Patient On (Oxygen Delivery Method): room air    Physical Exam   Gen: NAD, comfortable  HENT: atraumatic head and ears, no gross abnormalities of ears, mucous membranes moist, no oral lesions, neck supple without masses/goiter/lymphadenopathy  CV: RRR, nl s1/s2, no M/R/G  Pulm: nl respiratory effort, CTAB, no wheezes/crackles/rhonchi  Back: no R flank pain. no scoliosis, lordosis, or kyphosis  Abd: normoactive bowel sounds in all 4 quadrants, soft, nontender, nondistended, no rebound, no guarding, no masses  Extremities: no pedal edema, pedal pulses palpable   Skin: nl warm and dry, no wounds   Neuro: A&Ox3, answering questions appropriately, PERRL, EOMI, face symmetric, sensation equal bilaterally in face, tongue midline, no dysarthria, 5/5 strength in upper and lower extremities bilaterally, sensation intact in upper and lower extremities bilaterally, nl finger to nose, and nl heel to shin   Pysch: no depression, no SI, no HI 53 y/o M with PMH kidney stone presented to /ED2/2 right flank pain with radiation to the RLQ of the abdomen and groin and dark urine. Imaging showed obstructive calculus (1.1 cm)in the UPJ with new mild right hydronephrosis in the right kidney and a nonobstructive left nephrolithiasis. ED course vitals were stable and had  elevated WBC. Ceftriaxone, IVF, and pain medication were administered and admitted to Med/surg fort further management. On the fontenot antibiotic was continued. Urology was consulted and right ureteroscopy, laser lithotripsy, stone extraction, and stent placement was performed without complication. Blood culture with no growth until the day of discharge. Urine culture with normal urogenital pratik. On the day of discharge patient was pain free and medically cleared to return home. Pt has to follow-up with Urologist  in 1-2  weeks time for stent removal.    9/16/22  Pt states of feeling better. No pain at this time. Vitals and examination wnl     Vital Signs Last 24 Hrs  T(C): 36.6 (16 Sep 2022 08:38), Max: 36.8 (15 Sep 2022 17:10)  T(F): 97.8 (16 Sep 2022 08:38), Max: 98.3 (15 Sep 2022 17:10)  HR: 72 (16 Sep 2022 08:38) (58 - 79)  BP: 125/72 (16 Sep 2022 08:38) (118/58 - 137/78)  BP(mean): --  RR: 17 (16 Sep 2022 08:38) (11 - 18)  SpO2: 95% (16 Sep 2022 08:38) (95% - 100%)    Parameters below as of 16 Sep 2022 08:38  Patient On (Oxygen Delivery Method): room air    Physical Exam   Gen: NAD, comfortable  HENT: atraumatic head and ears, no gross abnormalities of ears, mucous membranes moist, no oral lesions, neck supple without masses/goiter/lymphadenopathy  CV: RRR, nl s1/s2, no M/R/G  Pulm: nl respiratory effort, CTAB, no wheezes/crackles/rhonchi  Back: no R flank pain. no scoliosis, lordosis, or kyphosis  Abd: normoactive bowel sounds in all 4 quadrants, soft, nontender, nondistended, no rebound, no guarding, no masses  Extremities: no pedal edema, pedal pulses palpable   Skin: nl warm and dry, no wounds   Neuro: A&Ox3, answering questions appropriately, PERRL, EOMI, face symmetric, sensation equal bilaterally in face, tongue midline, no dysarthria, 5/5 strength in upper and lower extremities bilaterally, sensation intact in upper and lower extremities bilaterally, nl finger to nose, and nl heel to shin   Pysch: no depression, no SI, no HI

## 2022-09-16 NOTE — DISCHARGE NOTE NURSING/CASE MANAGEMENT/SOCIAL WORK - PATIENT PORTAL LINK FT
You can access the FollowMyHealth Patient Portal offered by [facility] by registering at the following website: [email or web address]. By joining "[email or web address]’s FollowMyHealth portal, you will also be able to view your health information using other applications (apps) compatible with our system.

## 2022-09-16 NOTE — DISCHARGE NOTE PROVIDER - NSDCMRMEDTOKEN_GEN_ALL_CORE_FT
Flomax 0.4 mg oral capsule: 1 cap(s) orally once a day   phenazopyridine 100 mg oral tablet: 1 tab(s) orally 3 times a day    acetaminophen 325 mg oral tablet: 2 tab(s) orally every 6 hours, As needed, Temp greater or equal to 38C (100.4F), Mild Pain (1 - 3)  Flomax 0.4 mg oral capsule: 1 cap(s) orally once a day   phenazopyridine 100 mg oral tablet: 1 tab(s) orally 3 times a day

## 2022-09-16 NOTE — DISCHARGE NOTE PROVIDER - NSDCFUSCHEDAPPT_GEN_ALL_CORE_FT
[facility]  HNT PreAdmits  Scheduled Appointment: 09/28/2022    [facility]  UROLOGY [address]  Scheduled Appointment: 09/28/2022    Franky Roblero  [facility]  UROLOGY [address]  Scheduled Appointment: 09/28/2022    [facility]  Scheduled Appointment: 10/03/2022    Last Pro  [facility]  UROLOGY [address]  Scheduled Appointment: 10/03/2022    [facility]  UROLOGY [address]  Scheduled Appointment: 10/07/2022    Last Pro  [facility]  UROLOGY [address]  Scheduled Appointment: 10/07/2022     [facility]  UROLOGY [address]  Scheduled Appointment: 09/21/2022    Last Pro  [facility]  UROLOGY [address]  Scheduled Appointment: 09/21/2022    [facility]  Scheduled Appointment: 09/28/2022    [facility]  UROLOGY [address]  Scheduled Appointment: 09/28/2022    Franky Roblero  [facility]  UROLOGY [address]  Scheduled Appointment: 09/28/2022    [facility]  Scheduled Appointment: 10/03/2022    Last Pro  [facility]  UROLOGY [address]  Scheduled Appointment: 10/03/2022    [facility]  UROLOGY [address]  Scheduled Appointment: 10/07/2022    Last Pro  [facility]  UROLOGY [address]  Scheduled Appointment: 10/07/2022

## 2022-09-16 NOTE — DISCHARGE NOTE NURSING/CASE MANAGEMENT/SOCIAL WORK - NURSING SECTION COMPLETE
Case Management Initial Discharge Todd Maki,             Met with:patient to discuss discharge plans. Information verified: address, contacts, phone number, , insurance Yes  Insurance Provider: Gracia Hernandez     Emergency Contact/Next of Kin name & number: Merced Mendiola [phone]  Who are involved in patient's support system? Spouse     PCP: No primary care provider on file. Date of last visit: Patient sees Dr Karli Hernandez and was last seen 2 months ago       Discharge Planning    Living Arrangements:  Spouse/Significant Other, Children     Home has 2 stories  2 stairs to climb to get into front door, 1 flight stairs to climb to reach second floor  Location of bedroom/bathroom in home second floor     Patient able to perform ADL's:Independent    Current Services (outpatient & in home) none   DME equipment: na   DME provider: na     Is patient receiving oral anticoagulation therapy? No    If indicated:   Physician managing anticoagulation treatment: na   Where does patient obtain lab work for ATC treatment? Na       Potential Assistance Needed:  N/A    Patient agreeable to home care: No  [place] of choice provided:  n/a    Prior SNF/Rehab Placement and Facility: none   Agreeable to SNF/Rehab: No  [place] of choice provided: n/a     Evaluation: no    Expected Discharge date:  21    Patient expects to be discharged to: If home: is the family and/or caregiver wiling & able to provide support at home? Yes   Who will be providing this support? Spouse     Follow Up Appointment: Best Day/ Time: Wednesday PM    Transportation provider: family   Transportation arrangements needed for discharge: No    Readmission Risk              Risk of Unplanned Readmission:  0             Does patient have a readmission risk score greater than 14?: No  If yes, follow-up appointment must be made within 7 days of discharge. Goals of Care:  To be the same as I was before Patient/Caregiver provided printed discharge information.

## 2022-09-16 NOTE — DISCHARGE NOTE PROVIDER - CARE PROVIDER_API CALL
Last Pro)  Urology  [address], [address] [place] [address]  Phone: [phone]  Fax: [phone]  Established Patient  Follow Up Time: 1 week    Jc French)  Family Medicine  [address], [address]  [place] [address]  Phone: [phone]  Fax: [phone]  Established Patient  Follow Up Time: 1 week

## 2022-09-16 NOTE — DISCHARGE NOTE PROVIDER - PROVIDER TOKENS
PROVIDER:[TOKEN:[[id number]],FOLLOWUP:[1 week],ESTABLISHEDPATIENT:[T]],PROVIDER:[TOKEN:[[id number]],FOLLOWUP:[1 week],ESTABLISHEDPATIENT:[T]]

## 2022-09-16 NOTE — DISCHARGE NOTE NURSING/CASE MANAGEMENT/SOCIAL WORK - NSDCPEFALRISK_GEN_ALL_CORE
For information on Fall & Injury Prevention, visit: [email or web address] OR  [email or web address] OR  https://www.cdc.gov/steadi/patient.html

## 2022-09-16 NOTE — DISCHARGE NOTE PROVIDER - NSDCCPCAREPLAN_GEN_ALL_CORE_FT
PRINCIPAL DISCHARGE DIAGNOSIS  Diagnosis: Urolithiasis  Assessment and Plan of Treatment: You were diagnosed with obstructing R kidney stone which was treated with laser lithotirpsy and stent plancement. Continue taking Flomax and pyridium. You may take tylenol or ibuprofen for pain/discomfort. Dr. Pro will schedule an appointment for stent removal.      SECONDARY DISCHARGE DIAGNOSES  Diagnosis: Kidney stone on right side  Assessment and Plan of Treatment:     Diagnosis: Intractable abdominal pain  Assessment and Plan of Treatment: Your abdominal pain due to obstructing kidney stone. It was resoved after the procedure mentioned above.    Diagnosis: Renal colic  Assessment and Plan of Treatment: Renal colic was due to obstructing kideny stone. It was resoved after the procedure mentioned above.    Diagnosis: Lactic acidosis  Assessment and Plan of Treatment: Your lactic acid level was elevated during the hospitalizatin. It was later resloved.    Diagnosis: Urolithiasis  Assessment and Plan of Treatment:      PRINCIPAL DISCHARGE DIAGNOSIS  Diagnosis: Urolithiasis  Assessment and Plan of Treatment: You were diagnosed with obstructing R kidney stone which was treated with laser lithotirpsy and stent plancement. Continue taking Flomax and pyridium. You may take tylenol or ibuprofen for pain/discomfort. Dr. Pro will schedule an appointment for stent removal.      SECONDARY DISCHARGE DIAGNOSES  Diagnosis: Kidney stone on right side  Assessment and Plan of Treatment:     Diagnosis: Intractable abdominal pain  Assessment and Plan of Treatment: Your abdominal pain was due to obstructing kidney stone. It was resoved after the procedure mentioned above.    Diagnosis: Renal colic  Assessment and Plan of Treatment: Renal colic was due to obstructing kideny stone. It was resoved after the procedure mentioned above.    Diagnosis: Lactic acidosis  Assessment and Plan of Treatment: Your lactic acid level was elevated during the hospitalizatin. It was later resloved.    Diagnosis: Urolithiasis  Assessment and Plan of Treatment:      PRINCIPAL DISCHARGE DIAGNOSIS  Diagnosis: Urolithiasis  Assessment and Plan of Treatment: You were diagnosed with obstructing R kidney stone which was treated with laser lithotirpsy and stent plancement. Continue taking Flomax and pyridium. You may take tylenol or ibuprofen for pain/discomfort. Follow -up with Dr. Pro in 1-2 weeks time for stent removal .      SECONDARY DISCHARGE DIAGNOSES  Diagnosis: Intractable abdominal pain  Assessment and Plan of Treatment: Your abdominal pain was due to obstructing kidney stone. It was resoved after the procedure mentioned above.    Diagnosis: Lactic acidosis  Assessment and Plan of Treatment: Your lactic acid level was elevated during the hospitalizatin. It was later resloved.    Diagnosis: Renal colic  Assessment and Plan of Treatment: Renal colic was due to obstructing kideny stone. It was resoved after the procedure mentioned above.

## 2022-09-19 ENCOUNTER — NON-APPOINTMENT (OUTPATIENT)
Age: 52
End: 2022-09-19

## 2022-09-19 LAB
CULTURE RESULTS: SIGNIFICANT CHANGE UP
CULTURE RESULTS: SIGNIFICANT CHANGE UP
SPECIMEN SOURCE: SIGNIFICANT CHANGE UP
SPECIMEN SOURCE: SIGNIFICANT CHANGE UP

## 2022-09-21 ENCOUNTER — APPOINTMENT (OUTPATIENT)
Dept: UROLOGY | Facility: CLINIC | Age: 52
End: 2022-09-21

## 2022-09-21 VITALS
OXYGEN SATURATION: 96 % | HEART RATE: 69 BPM | BODY MASS INDEX: 28.79 KG/M2 | SYSTOLIC BLOOD PRESSURE: 120 MMHG | HEIGHT: 68 IN | WEIGHT: 190 LBS | DIASTOLIC BLOOD PRESSURE: 78 MMHG

## 2022-09-21 DIAGNOSIS — E87.2 ACIDOSIS: ICD-10-CM

## 2022-09-21 DIAGNOSIS — Z82.49 FAMILY HISTORY OF ISCHEMIC HEART DISEASE AND OTHER DISEASES OF THE CIRCULATORY SYSTEM: ICD-10-CM

## 2022-09-21 DIAGNOSIS — R73.9 HYPERGLYCEMIA, UNSPECIFIED: ICD-10-CM

## 2022-09-21 DIAGNOSIS — R31.0 GROSS HEMATURIA: ICD-10-CM

## 2022-09-21 DIAGNOSIS — Z95.2 PRESENCE OF PROSTHETIC HEART VALVE: ICD-10-CM

## 2022-09-21 DIAGNOSIS — N20.0 CALCULUS OF KIDNEY: ICD-10-CM

## 2022-09-21 DIAGNOSIS — N13.2 HYDRONEPHROSIS WITH RENAL AND URETERAL CALCULOUS OBSTRUCTION: ICD-10-CM

## 2022-09-21 DIAGNOSIS — Z46.6 ENCOUNTER FOR FITTING AND ADJUSTMENT OF URINARY DEVICE: ICD-10-CM

## 2022-09-21 DIAGNOSIS — Z79.899 OTHER LONG TERM (CURRENT) DRUG THERAPY: ICD-10-CM

## 2022-09-21 DIAGNOSIS — Z87.891 PERSONAL HISTORY OF NICOTINE DEPENDENCE: ICD-10-CM

## 2022-09-21 DIAGNOSIS — N40.0 BENIGN PROSTATIC HYPERPLASIA WITHOUT LOWER URINARY TRACT SYMPTOMS: ICD-10-CM

## 2022-09-21 DIAGNOSIS — Z83.1 FAMILY HISTORY OF OTHER INFECTIOUS AND PARASITIC DISEASES: ICD-10-CM

## 2022-09-21 DIAGNOSIS — E83.50 UNSPECIFIED DISORDER OF CALCIUM METABOLISM: ICD-10-CM

## 2022-09-21 PROCEDURE — 99214 OFFICE O/P EST MOD 30 MIN: CPT | Mod: 25

## 2022-09-21 PROCEDURE — 52310 CYSTOSCOPY AND TREATMENT: CPT

## 2022-09-27 PROBLEM — Z46.6 ENCOUNTER FOR REMOVAL OF URETERAL STENT: Status: ACTIVE | Noted: 2022-09-27

## 2022-09-27 PROBLEM — E83.50 DISORDER OF CALCIUM METABOLISM: Status: ACTIVE | Noted: 2022-09-27

## 2022-09-27 NOTE — HISTORY OF PRESENT ILLNESS
[FreeTextEntry1] : 52 year old male presents for follow up. \par Has some pain/discomfort from ureteral stent. \par \par Status post right ureteroscopy, laser lithotripsy, stone extraction and ureteral stent placement on 9/15/22 at [facility]. \par \par Has had kidney stone in the past.

## 2022-09-27 NOTE — ASSESSMENT
[FreeTextEntry1] : Disorder of calcium metabolism:\par Stone analysis pending\par Recommended Kidney stone prevention diet:\par Good oral hydration so that urine is clear to light yellow, usually 1.5 to 2 Liters of fluids, mainly water\par Increasing Citrate in diet by consuming citrus fruits and juices- marianne, limes, oranges, grapefruits and berries \par Less red meat\par Less salt\par Limit foods with oxalate like- dark green vegetables, rhubarb, chocolate, wheat bran, nuts, cranberries, and beans\par \par Discussed Kidney stone metabolic work up- Ca, PTH, Uric acid and 24 Hr urine kidney stone risk profile. Will like to hold off for now.\par \par Kidney stone:\par Encounter for stent removal:\par Performed cystoscopy and right ureteral stent removal today.  \par Will get Renal Ultrasound and KUB before follow up appointment. \par \par Return to office in 2 months or sooner if any issues.

## 2022-09-28 ENCOUNTER — APPOINTMENT (OUTPATIENT)
Dept: UROLOGY | Facility: CLINIC | Age: 52
End: 2022-09-28

## 2022-10-03 ENCOUNTER — APPOINTMENT (OUTPATIENT)
Dept: UROLOGY | Facility: HOSPITAL | Age: 52
End: 2022-10-03

## 2022-10-07 ENCOUNTER — APPOINTMENT (OUTPATIENT)
Dept: UROLOGY | Facility: CLINIC | Age: 52
End: 2022-10-07

## 2022-10-27 ENCOUNTER — NON-APPOINTMENT (OUTPATIENT)
Age: 52
End: 2022-10-27

## 2022-10-28 ENCOUNTER — NON-APPOINTMENT (OUTPATIENT)
Age: 52
End: 2022-10-28

## 2022-10-28 RX ORDER — OXYCODONE AND ACETAMINOPHEN 5; 325 MG/1; MG/1
5-325 TABLET ORAL
Qty: 16 | Refills: 0 | Status: ACTIVE | COMMUNITY
Start: 2022-09-13 | End: 1900-01-01

## 2022-11-23 ENCOUNTER — APPOINTMENT (OUTPATIENT)
Dept: UROLOGY | Facility: CLINIC | Age: 52
End: 2022-11-23

## 2022-12-19 NOTE — PATIENT PROFILE ADULT - NSPROGENPREVTRANSF_GEN_A_NUR
[place] ED to IP Report (EDIP)      Mental Status     Alert and oriented    Safety     ED to IP Report Safety: None    Mobility    ED to IP Report Mobility: Did not assess in ED    Protocols  ED to IP Report Protocols: None    ISAR      4 (12/19/22 0945)    Isolation  ED to IP Report Isolation: None    Telemetry  Telemetry: Per EKG    Oxygen   Ventilation: oxygen and on room air     COVID Vaccination Status  yes, confirmed    Additional Information: [facility] u[facility] if change in urine color.   
Bed: 13  Expected date:   Expected time:   Means of arrival:   Comments:  89 F blood in urine s/p bladder tumor removal. Recently here for same. 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   
no history of blood product transfusion

## 2023-07-17 ENCOUNTER — APPOINTMENT (OUTPATIENT)
Dept: FAMILY MEDICINE | Facility: CLINIC | Age: 53
End: 2023-07-17
Payer: COMMERCIAL

## 2023-07-17 VITALS
OXYGEN SATURATION: 97 % | TEMPERATURE: 97 F | DIASTOLIC BLOOD PRESSURE: 78 MMHG | BODY MASS INDEX: 29.4 KG/M2 | SYSTOLIC BLOOD PRESSURE: 102 MMHG | WEIGHT: 194 LBS | HEART RATE: 60 BPM | HEIGHT: 68 IN

## 2023-07-17 DIAGNOSIS — N20.0 CALCULUS OF KIDNEY: ICD-10-CM

## 2023-07-17 DIAGNOSIS — Z00.00 ENCOUNTER FOR GENERAL ADULT MEDICAL EXAMINATION W/OUT ABNORMAL FINDINGS: ICD-10-CM

## 2023-07-17 PROCEDURE — 99396 PREV VISIT EST AGE 40-64: CPT | Mod: 25

## 2023-07-17 PROCEDURE — 36415 COLL VENOUS BLD VENIPUNCTURE: CPT

## 2023-07-17 NOTE — HEALTH RISK ASSESSMENT
[0] : 2) Feeling down, depressed, or hopeless: Not at all (0) [PHQ-2 Negative - No further assessment needed] : PHQ-2 Negative - No further assessment needed [Former] : Former [> 15 Years] : > 15 Years [10-14] : 10-14 [[id number]] : 0

## 2023-07-17 NOTE — HISTORY OF PRESENT ILLNESS
[FreeTextEntry1] : RAPHAEL PALM is a 52 year old male here for a physical exam.  [de-identified] : His last physical exam was last year\par \par Vaccines:\par Tetanus is up to date per patient\par Shingrix vaccine is NOT up to date\par COVID vaccine is up to date\par \par His last dentist visit was less than one year ago\par His last eye doctor appointment was a few years ago\par His last dermatologist visit was less than one year ago\par \par Colon cancer screening: colonoscopy 5/2022, Dr. Flores, repeat 3 years\par \par His diet is healthy overall\par Exercise: walking

## 2023-07-17 NOTE — PLAN
[FreeTextEntry1] : Check labs as above. \par \par Reviewed age-appropriate preventive screening tests with patient. He is due for Shingrix but this is out of stock today.\par \par Discussed clean eating (eg Mediterranean style eating plan) and regular exercise/staying as physically active as possible.  Include balance exercises and strength training and core strengthening exercises for bone health and to decrease risk for falls.\par \par Reviewed importance of good self care (e.g. meditation, yoga, adequate rest, regular exercise, magnesium, clean eating, etc.).\par \par Follow up for next physical in one year.

## 2023-07-17 NOTE — ASSESSMENT
[FreeTextEntry1] : RAPHAEL PALM is a 52 year old male here for a physical exam.\par \par He has a history of kidney stones, managed by Urology (Dr. Pro).\par \par He sees a cardiologist regularly and does not need an EKG today. \par \par He reports a recent cough, a few times per day. Today he feels pain with deep inspiration in his mid-back. His exam is normal.\par \par The U.S. Preventive Services Task Force (USPSTF) recommends yearly lung cancer screening with LDCT for people who:\par ·	Have a 20 pack-year or more smoking history, and\par ·	Smoke now or have quit within the past 15 years, and\par ·	Are between 50 and 80 years old.\par \par Risks of Screening\par Lung cancer screening has at least three risks:\par ·	A lung cancer screening test can suggest that a person has lung cancer when no cancer is present. This is called a false-positive result. False-positive results can lead to follow-up tests and surgeries that are not needed and may have more risks.\par ·	A lung cancer screening test can find cases of cancer that may never have caused a problem for the patient. This is called overdiagnosis. Overdiagnosis can lead to treatment that is not needed.\par ·	Radiation from repeated LDCT tests can cause cancer in otherwise healthy people.\par \par That is why lung cancer screening is recommended only for adults who are at high risk for developing the disease because of their smoking history and age, and who do not have a health problem that substantially limits their life expectancy or their ability or willingness to have lung surgery, if needed.\par \par The best way to reduce your risk of lung cancer is to not smoke and to avoid secondhand smoke. Lung cancer screening is not a substitute for quitting smoking.\par \par The information above was discussed with the patient.\par \par He agrees to have a low dose CT for lung cancer screening.\par

## 2023-07-18 LAB
ALBUMIN SERPL ELPH-MCNC: 4.8 G/DL
ALP BLD-CCNC: 89 U/L
ALT SERPL-CCNC: 16 U/L
ANION GAP SERPL CALC-SCNC: 13 MMOL/L
AST SERPL-CCNC: 14 U/L
BASOPHILS # BLD AUTO: 0.07 K/UL
BASOPHILS NFR BLD AUTO: 0.9 %
BILIRUB SERPL-MCNC: 0.5 MG/DL
BUN SERPL-MCNC: 14 MG/DL
CALCIUM SERPL-MCNC: 9.8 MG/DL
CHLORIDE SERPL-SCNC: 102 MMOL/L
CHOLEST SERPL-MCNC: 226 MG/DL
CO2 SERPL-SCNC: 24 MMOL/L
CREAT SERPL-MCNC: 0.9 MG/DL
EGFR: 104 ML/MIN/1.73M2
EOSINOPHIL # BLD AUTO: 0.17 K/UL
EOSINOPHIL NFR BLD AUTO: 2.1 %
GLUCOSE SERPL-MCNC: 88 MG/DL
HCT VFR BLD CALC: 44.3 %
HDLC SERPL-MCNC: 52 MG/DL
HGB BLD-MCNC: 14.2 G/DL
IMM GRANULOCYTES NFR BLD AUTO: 0.3 %
LDLC SERPL CALC-MCNC: 149 MG/DL
LYMPHOCYTES # BLD AUTO: 2.37 K/UL
LYMPHOCYTES NFR BLD AUTO: 29.8 %
MAN DIFF?: NORMAL
MCHC RBC-ENTMCNC: 28.8 PG
MCHC RBC-ENTMCNC: 32.1 GM/DL
MCV RBC AUTO: 89.9 FL
MONOCYTES # BLD AUTO: 0.52 K/UL
MONOCYTES NFR BLD AUTO: 6.5 %
NEUTROPHILS # BLD AUTO: 4.8 K/UL
NEUTROPHILS NFR BLD AUTO: 60.4 %
NONHDLC SERPL-MCNC: 174 MG/DL
PLATELET # BLD AUTO: 322 K/UL
POTASSIUM SERPL-SCNC: 4.3 MMOL/L
PROT SERPL-MCNC: 7.3 G/DL
PSA SERPL-MCNC: 1.55 NG/ML
RBC # BLD: 4.93 M/UL
RBC # FLD: 13.5 %
SODIUM SERPL-SCNC: 139 MMOL/L
TRIGL SERPL-MCNC: 138 MG/DL
WBC # FLD AUTO: 7.95 K/UL

## 2023-07-27 ENCOUNTER — NON-APPOINTMENT (OUTPATIENT)
Age: 53
End: 2023-07-27

## 2023-09-18 ENCOUNTER — NON-APPOINTMENT (OUTPATIENT)
Age: 53
End: 2023-09-18

## 2023-09-18 VITALS — BODY MASS INDEX: 28.79 KG/M2 | WEIGHT: 190 LBS | HEIGHT: 68 IN

## 2023-09-18 DIAGNOSIS — Z87.891 PERSONAL HISTORY OF NICOTINE DEPENDENCE: ICD-10-CM

## 2023-09-21 ENCOUNTER — APPOINTMENT (OUTPATIENT)
Dept: CT IMAGING | Facility: CLINIC | Age: 53
End: 2023-09-21
Payer: COMMERCIAL

## 2023-09-21 ENCOUNTER — OUTPATIENT (OUTPATIENT)
Dept: OUTPATIENT SERVICES | Facility: HOSPITAL | Age: 53
LOS: 1 days | End: 2023-09-21
Payer: COMMERCIAL

## 2023-09-21 DIAGNOSIS — Z00.8 ENCOUNTER FOR OTHER GENERAL EXAMINATION: ICD-10-CM

## 2023-09-21 DIAGNOSIS — Z87.891 PERSONAL HISTORY OF NICOTINE DEPENDENCE: ICD-10-CM

## 2023-09-21 DIAGNOSIS — Z98.52 VASECTOMY STATUS: Chronic | ICD-10-CM

## 2023-09-21 PROCEDURE — 71271 CT THORAX LUNG CANCER SCR C-: CPT

## 2023-09-21 PROCEDURE — 71271 CT THORAX LUNG CANCER SCR C-: CPT | Mod: 26

## 2023-11-03 NOTE — ED ADULT TRIAGE NOTE - HISTORY OF COVID-19 VACCINATION
Medication Refill    Medication needing refilled:  sacubitril-valsartan (ENTRESTO)     Dosage of the medication:  49-51 MG     How are you taking this medication (QD, BID, TID, QID, PRN):    30 or 90 day supply called in:  90  When will you run out of your medication:    Which Pharmacy are we sending the medication to?:  [facility] [id number] [phone] Alban Gant [phone]   [place] [address] [address]   Phone:  [phone]  Fax:  [phone] Vaccine status unknown

## 2023-11-09 ENCOUNTER — APPOINTMENT (OUTPATIENT)
Dept: FAMILY MEDICINE | Facility: CLINIC | Age: 53
End: 2023-11-09
Payer: COMMERCIAL

## 2023-11-09 VITALS
WEIGHT: 195 LBS | OXYGEN SATURATION: 97 % | HEART RATE: 91 BPM | BODY MASS INDEX: 29.55 KG/M2 | HEIGHT: 68 IN | TEMPERATURE: 97 F

## 2023-11-09 DIAGNOSIS — Z23 ENCOUNTER FOR IMMUNIZATION: ICD-10-CM

## 2023-11-09 DIAGNOSIS — R51.9 HEADACHE, UNSPECIFIED: ICD-10-CM

## 2023-11-09 DIAGNOSIS — G43.909 MIGRAINE, UNSPECIFIED, NOT INTRACTABLE, W/OUT STATUS MIGRAINOSUS: ICD-10-CM

## 2023-11-09 PROCEDURE — 99213 OFFICE O/P EST LOW 20 MIN: CPT | Mod: 25

## 2023-11-09 PROCEDURE — G0008: CPT

## 2023-11-09 PROCEDURE — 90686 IIV4 VACC NO PRSV 0.5 ML IM: CPT

## 2023-11-09 RX ORDER — SUMATRIPTAN 50 MG/1
50 TABLET, FILM COATED ORAL
Qty: 9 | Refills: 11 | Status: ACTIVE | COMMUNITY
Start: 2023-11-09 | End: 1900-01-01

## 2023-12-04 ENCOUNTER — NON-APPOINTMENT (OUTPATIENT)
Age: 53
End: 2023-12-04

## 2023-12-07 ENCOUNTER — APPOINTMENT (OUTPATIENT)
Dept: FAMILY MEDICINE | Facility: CLINIC | Age: 53
End: 2023-12-07

## 2024-08-26 ENCOUNTER — RESULT CHARGE (OUTPATIENT)
Age: 54
End: 2024-08-26

## 2024-09-05 NOTE — PHYSICAL EXAM
[No Acute Distress] : no acute distress [Well Nourished] : well nourished [Well Developed] : well developed [Well-Appearing] : well-appearing [Normal Sclera/Conjunctiva] : normal sclera/conjunctiva [PERRL] : pupils equal round and reactive to light [EOMI] : extraocular movements intact [Normal Outer Ear/Nose] : the outer ears and nose were normal in appearance [Normal Oropharynx] : the oropharynx was normal [No Lymphadenopathy] : no lymphadenopathy [No JVD] : no jugular venous distention [Supple] : supple [Thyroid Normal, No Nodules] : the thyroid was normal and there were no nodules present [No Respiratory Distress] : no respiratory distress  [No Accessory Muscle Use] : no accessory muscle use [Clear to Auscultation] : lungs were clear to auscultation bilaterally [Normal Rate] : normal rate  [Regular Rhythm] : with a regular rhythm [Normal S1, S2] : normal S1 and S2 [No Murmur] : no murmur heard [No Carotid Bruits] : no carotid bruits [No Abdominal Bruit] : a ~M bruit was not heard ~T in the abdomen [No Varicosities] : no varicosities [Pedal Pulses Present] : the pedal pulses are present [No Edema] : there was no peripheral edema [No Palpable Aorta] : no palpable aorta [No Extremity Clubbing/Cyanosis] : no extremity clubbing/cyanosis [Soft] : abdomen soft [Non Tender] : non-tender [No Masses] : no abdominal mass palpated [Non-distended] : non-distended [No HSM] : no HSM [Normal Bowel Sounds] : normal bowel sounds [Normal Posterior Cervical Nodes] : no posterior cervical lymphadenopathy [Normal Anterior Cervical Nodes] : no anterior cervical lymphadenopathy [No CVA Tenderness] : no CVA  tenderness [No Spinal Tenderness] : no spinal tenderness [No Joint Swelling] : no joint swelling [Grossly Normal Strength/Tone] : grossly normal strength/tone [No Rash] : no rash [Coordination Grossly Intact] : coordination grossly intact [No Focal Deficits] : no focal deficits [Normal Gait] : normal gait [Normal Affect] : the affect was normal [Deep Tendon Reflexes (DTR)] : deep tendon reflexes were 2+ and symmetric [Normal Insight/Judgement] : insight and judgment were intact

## 2024-09-06 ENCOUNTER — APPOINTMENT (OUTPATIENT)
Dept: FAMILY MEDICINE | Facility: CLINIC | Age: 54
End: 2024-09-06

## 2024-09-06 VITALS
WEIGHT: 180 LBS | SYSTOLIC BLOOD PRESSURE: 120 MMHG | HEIGHT: 68 IN | OXYGEN SATURATION: 96 % | DIASTOLIC BLOOD PRESSURE: 78 MMHG | HEART RATE: 93 BPM | BODY MASS INDEX: 27.28 KG/M2 | TEMPERATURE: 97.4 F

## 2024-09-06 DIAGNOSIS — Z00.00 ENCOUNTER FOR GENERAL ADULT MEDICAL EXAMINATION W/OUT ABNORMAL FINDINGS: ICD-10-CM

## 2024-09-06 DIAGNOSIS — G43.909 MIGRAINE, UNSPECIFIED, NOT INTRACTABLE, W/OUT STATUS MIGRAINOSUS: ICD-10-CM

## 2024-09-06 DIAGNOSIS — Z23 ENCOUNTER FOR IMMUNIZATION: ICD-10-CM

## 2024-09-06 PROCEDURE — G0008: CPT

## 2024-09-06 PROCEDURE — 99396 PREV VISIT EST AGE 40-64: CPT | Mod: 25

## 2024-09-06 PROCEDURE — 90686 IIV4 VACC NO PRSV 0.5 ML IM: CPT

## 2024-09-06 NOTE — HEALTH RISK ASSESSMENT
[PHQ-2 Negative - No further assessment needed] : PHQ-2 Negative - No further assessment needed [0] : 2) Feeling down, depressed, or hopeless: Not at all (0) [Former] : Former [20 or more] : 20 or more [< 15 Years] : < 15 Years [[id number]] : 0

## 2024-09-06 NOTE — ASSESSMENT
[FreeTextEntry1] : RAPHAEL PALM is a 54 year old male here for a physical exam.  He has a history of kidney stones and migraines.  He is a former smoker and had a low dose lung CT for cancer screening last year. He is due for a repeat CT in 10/2024.  He sees a cardiologist regularly and does not need an EKG today.

## 2024-09-06 NOTE — HISTORY OF PRESENT ILLNESS
[FreeTextEntry1] : RAPHAEL PALM is a 54 year old male here for a physical exam. [de-identified] : His last physical exam was last year  Vaccines: Tetanus is up to date, 2025? Shingrix is NOT up to date  His last dentist visit was less than one year ago His last eye doctor appointment was less than one year ago His last dermatologist visit was less than one year ago, [facility]  Colon cancer screening is up to date, colonoscopy 5/2022, Dr. Flores, repeat 3 years  His diet is healthy overall Exercise: walking

## 2024-09-06 NOTE — HEALTH RISK ASSESSMENT
[0] : 2) Feeling down, depressed, or hopeless: Not at all (0) [PHQ-2 Negative - No further assessment needed] : PHQ-2 Negative - No further assessment needed [Former] : Former [20 or more] : 20 or more [< 15 Years] : < 15 Years [[id number]] : 0

## 2024-09-06 NOTE — HISTORY OF PRESENT ILLNESS
[FreeTextEntry1] : RAPHAEL PALM is a 54 year old male here for a physical exam. [de-identified] : His last physical exam was last year  Vaccines: Tetanus is up to date, 2025? Shingrix is NOT up to date  His last dentist visit was less than one year ago His last eye doctor appointment was less than one year ago His last dermatologist visit was less than one year ago, [facility]  Colon cancer screening is up to date, colonoscopy 5/2022, Dr. Flores, repeat 3 years  His diet is healthy overall Exercise: walking

## 2024-09-06 NOTE — PLAN
[FreeTextEntry1] : Continue all medications as prescribed. Check labs as above. He is not fasting and will go to the lab tomorrow. Will adjust any medications based upon lab results.  Reviewed age-appropriate preventive screening tests with patient. He does not recall if he had chicken pox. Will check his VZV antibody and offer Shingrix if he is immune.  Discussed clean eating (eg Mediterranean style eating plan) and regular exercise/staying as physically active as possible.  Include balance exercises and strength training and core strengthening exercises for bone health and to decrease risk for falls.  Reviewed importance of good self care (e.g. meditation, yoga, adequate rest, regular exercise, magnesium, clean eating, etc.).  Follow up for next physical in one year.

## 2024-09-15 ENCOUNTER — NON-APPOINTMENT (OUTPATIENT)
Age: 54
End: 2024-09-15

## 2024-09-16 VITALS — BODY MASS INDEX: 27.28 KG/M2 | WEIGHT: 180 LBS | HEIGHT: 68 IN

## 2024-09-16 DIAGNOSIS — Z87.891 PERSONAL HISTORY OF NICOTINE DEPENDENCE: ICD-10-CM

## 2024-09-16 NOTE — HISTORY OF PRESENT ILLNESS
[Former] : Former [TextBox_13] : Mr. PALM is a 54 year old male with a history of HLD.  Reviewed and confirmed that the patient meets screening eligibility criteria:  54 years old   Smoking Status: Former smoker   Number of pack(s) per day: 1 Number of years smoked: 25 Number of pack years smokin Quit year:   No symptoms of lung cancer, including new cough, change in cough, hemoptysis, and unintentional weight loss.  No personal history of lung cancer.  No lung cancer in a first degree relative.  No history of lung disease or occupational exposures. [YearQuit] : 2012 [PacksperDay] : 1 [N_Years] : 25 [PacksperYear] : 25

## 2024-10-09 ENCOUNTER — OUTPATIENT (OUTPATIENT)
Dept: OUTPATIENT SERVICES | Facility: HOSPITAL | Age: 54
LOS: 1 days | End: 2024-10-09
Payer: COMMERCIAL

## 2024-10-09 ENCOUNTER — APPOINTMENT (OUTPATIENT)
Dept: CT IMAGING | Facility: CLINIC | Age: 54
End: 2024-10-09

## 2024-10-09 DIAGNOSIS — Z87.891 PERSONAL HISTORY OF NICOTINE DEPENDENCE: ICD-10-CM

## 2024-10-09 DIAGNOSIS — Z98.52 VASECTOMY STATUS: Chronic | ICD-10-CM

## 2024-10-09 PROCEDURE — 71271 CT THORAX LUNG CANCER SCR C-: CPT | Mod: 26

## 2024-10-09 PROCEDURE — 71271 CT THORAX LUNG CANCER SCR C-: CPT

## 2024-12-15 ENCOUNTER — NON-APPOINTMENT (OUTPATIENT)
Age: 54
End: 2024-12-15

## 2024-12-16 ENCOUNTER — APPOINTMENT (OUTPATIENT)
Dept: FAMILY MEDICINE | Facility: CLINIC | Age: 54
End: 2024-12-16
Payer: COMMERCIAL

## 2024-12-16 VITALS
TEMPERATURE: 97.9 F | WEIGHT: 180 LBS | BODY MASS INDEX: 27.28 KG/M2 | HEART RATE: 89 BPM | HEIGHT: 68 IN | DIASTOLIC BLOOD PRESSURE: 82 MMHG | SYSTOLIC BLOOD PRESSURE: 130 MMHG | OXYGEN SATURATION: 97 %

## 2024-12-16 DIAGNOSIS — J02.9 ACUTE PHARYNGITIS, UNSPECIFIED: ICD-10-CM

## 2024-12-16 DIAGNOSIS — J06.9 ACUTE UPPER RESPIRATORY INFECTION, UNSPECIFIED: ICD-10-CM

## 2024-12-16 PROCEDURE — 99213 OFFICE O/P EST LOW 20 MIN: CPT

## 2024-12-16 RX ORDER — ALBUTEROL SULFATE 90 UG/1
108 (90 BASE) INHALANT RESPIRATORY (INHALATION)
Qty: 1 | Refills: 3 | Status: ACTIVE | COMMUNITY
Start: 2024-12-16 | End: 1900-01-01

## 2025-03-03 ENCOUNTER — APPOINTMENT (OUTPATIENT)
Dept: SURGERY | Facility: CLINIC | Age: 55
End: 2025-03-03
Payer: COMMERCIAL

## 2025-03-03 VITALS
RESPIRATION RATE: 16 BRPM | HEIGHT: 68 IN | HEART RATE: 73 BPM | OXYGEN SATURATION: 97 % | TEMPERATURE: 97.6 F | WEIGHT: 195 LBS | SYSTOLIC BLOOD PRESSURE: 110 MMHG | BODY MASS INDEX: 29.55 KG/M2 | DIASTOLIC BLOOD PRESSURE: 80 MMHG

## 2025-03-03 DIAGNOSIS — G47.30 SLEEP APNEA, UNSPECIFIED: ICD-10-CM

## 2025-03-03 DIAGNOSIS — K42.9 UMBILICAL HERNIA W/OUT OBSTRUCTION OR GANGRENE: ICD-10-CM

## 2025-03-03 PROCEDURE — 99204 OFFICE O/P NEW MOD 45 MIN: CPT

## 2025-03-03 RX ORDER — ROSUVASTATIN CALCIUM 10 MG/1
10 TABLET, FILM COATED ORAL
Refills: 0 | Status: ACTIVE | COMMUNITY

## 2025-03-17 ENCOUNTER — RX RENEWAL (OUTPATIENT)
Age: 55
End: 2025-03-17

## 2025-05-05 ENCOUNTER — TRANSCRIPTION ENCOUNTER (OUTPATIENT)
Age: 55
End: 2025-05-05

## 2025-07-11 ENCOUNTER — APPOINTMENT (OUTPATIENT)
Dept: FAMILY MEDICINE | Facility: CLINIC | Age: 55
End: 2025-07-11
Payer: COMMERCIAL

## 2025-07-11 VITALS
HEIGHT: 68 IN | SYSTOLIC BLOOD PRESSURE: 122 MMHG | BODY MASS INDEX: 28.49 KG/M2 | WEIGHT: 188 LBS | DIASTOLIC BLOOD PRESSURE: 68 MMHG | OXYGEN SATURATION: 95 % | HEART RATE: 90 BPM | TEMPERATURE: 97.1 F

## 2025-07-11 PROBLEM — F90.9 ADHD (ATTENTION DEFICIT HYPERACTIVITY DISORDER): Status: ACTIVE | Noted: 2025-07-11

## 2025-07-11 PROBLEM — E78.00 HYPERCHOLESTEROLEMIA: Status: ACTIVE | Noted: 2025-07-11

## 2025-07-11 PROCEDURE — G2211 COMPLEX E/M VISIT ADD ON: CPT | Mod: NC

## 2025-07-11 PROCEDURE — 99214 OFFICE O/P EST MOD 30 MIN: CPT

## 2025-07-11 RX ORDER — LISDEXAMFETAMINE DIMESYLATE 40 MG/1
40 CAPSULE ORAL DAILY
Qty: 30 | Refills: 0 | Status: ACTIVE | COMMUNITY
Start: 2025-07-11 | End: 1900-01-01

## 2025-08-07 ENCOUNTER — TRANSCRIPTION ENCOUNTER (OUTPATIENT)
Age: 55
End: 2025-08-07

## 2025-08-18 ENCOUNTER — TRANSCRIPTION ENCOUNTER (OUTPATIENT)
Age: 55
End: 2025-08-18